# Patient Record
Sex: FEMALE | Race: WHITE | Employment: OTHER | URBAN - METROPOLITAN AREA
[De-identification: names, ages, dates, MRNs, and addresses within clinical notes are randomized per-mention and may not be internally consistent; named-entity substitution may affect disease eponyms.]

---

## 2017-03-13 ENCOUNTER — TELEPHONE (OUTPATIENT)
Dept: AUDIOLOGY | Facility: CLINIC | Age: 82
End: 2017-03-13

## 2017-03-13 NOTE — TELEPHONE ENCOUNTER
Advanced whoactually to process exchange of bebenedicto Mimsida CI Q70 #[id number] for a Q90.  The Q90 will ship to the clinic and will be fit at the patient's 4/3 visit.  The patient expressed understanding and agreement with this plan.      Hannah Chu, CCC-A  Licensed Audiologist  [place] #[id number]

## 2017-04-03 ENCOUNTER — OFFICE VISIT (OUTPATIENT)
Dept: AUDIOLOGY | Facility: CLINIC | Age: 82
End: 2017-04-03

## 2017-04-03 DIAGNOSIS — H90.3 SENSORY HEARING LOSS, BILATERAL: Primary | ICD-10-CM

## 2017-04-03 NOTE — PROGRESS NOTES
AUDIOLOGY REPORT    BACKGROUND INFORMATION: Dr. Brent Billy implanted Radha Bunch with a right  [employer] CII cochlear implant (CI) on 01/07/2002 due to profound sensorineural hearing loss bilaterally and lack of benefit from hearing aids.  The patient is being seen for programming and testing with her cochlear implant on 4/3/2017 in Audiology at the [facility].      PATIENT REPORT: Mrs. Bunch arrives with her America Q90 processor, ready for it to be fit so she can return her Q70 to [employer] (free exchange).  She is also here for her annual testing.  This testing was attempted in October 2016 but thresholds were poorer than ideal, so programming was completed and testing was postponed.        FITTING SESSION: Brent Billy MD ordered today's appointment. The patient came to the clinic for adjustment to the programs in the external speech processor and for assessment of the external components of the cochlear implant system. These components provide power and data to the internal device. Sound is only heard once the external portion is activated. Postoperative treatment, including device fitting and adjustment, audiologic assessments, and training are required at regular intervals. Testing included electropsychophysical measures of threshold, comfort, and loudness balancing was completed to update the program.    Processor type for right ear: [employer] CI Q90 (serial # [id number]) sound processor was fit today and Q70 (serial #[id number]) was returned to [employer] on behalf of the patient (tracking #[id number])  Tmic2 cover changed and normal listening check.    Headpiece type: Universal headpiece (UHP) for [place], HR90K for [place]  Magnet strength: Reduced from 2 to 1 in primary UHP at a past visit - no irritation.  Patient did not bring backups so they will be checked at a future visit.      TEST RESULTS:   Listening check:  normal   Electrode Impedances: Normal and stable.  Pulse width increased slightly.    Neural Response Testing: not performed  Facial Stimulation: Absent  Tinnitus: Absent  Balance Problems: Absent  Pain/Discomfort: Absent  Strategies Tried: HiResolution P, Optima P with Clear Voice medium  Strategy Preference: Optima P with Clear Voice medium    Programs in America CI Q90:  1. [place] P everyday program (74), processor may + Tmic (patient preference), IDR 70, APW II, Clear Voice medium  2. [place] P autoUltraZoom program (75), IDR 70, APW II, Clear Voice medium  3. [place] P UltraZoom program (76), IDR 70, APW II, Clear Voice medium    Programs were not adjusted in the [employer] processor today since she did not bring the device.      Number of Channels per Program: 14 (1 & 2 are off for sound quality complaints)    Most comfortable (M) loudness levels were increased globally in live voice 12 units to achieve comfort.  She declined adding comfort features like WindBlock.  A new autoUltraZoom program was added in program 2 for the patient to try.       After programming, testing was completed.      METHOD: Speech perception testing is conducted at regular intervals to determine the degree of benefit the patient is obtaining from the cochlear implant. Tests are conducted using the cochlear implant without the benefit of lipreading. All tests are conducted in a sound-treated room. Perception of monosyllabic words and words in sentences are tested. Results usually show improvement over time. A decrease in performance indicates the need for re-programming of the prosthesis and/or replacement of components.     INTERVAL: 15 years    TEST RESULTS:  35 minutes were spent assessing the patient s auditory rehabilitation status.    Device(s) used for Testing:   Right ear: [employer] America CI Q90 sound processor, program 1 with start up volume  Left ear: Not applicable - not aided    Tympanograms: Left normal eardrum  mobility, Right shallow with no otologic symptoms - similar to past.  Patient had ear cleaning and ear check with her physician within the last 2 weeks.  She notes there was a small amount of bleeding from the outer portion of the right ear canal after the ear cleaning and a small amount of dried blood could be visualized near the opening of the canal today.  Patient denies pain.  She will continue to follow up with Dr. Billy for medical issues related to her ears or cochlear implants as needed.      Soundfield Aided Thresholds: Detection in normal to mild loss range.  Significant improvement compared to October 2016 testing.  Similar to August 2015 results.      NOTE: Today's testing was completed at 60 dB SPL to be consistent with new recommended test parameters.  All past tests were conducted at 70 dB SPL.      CNC Words Test:  The patient repeats 25 single syllable words, auditory only. The words are presented at 70 dB SPL (louder than conversational level) delivered from a CD player.    Preoperative Performance: 28%  3 months: 8% at 70 dB SPL   6 months: 20% at 70 dB SPL  12 months: 22% at 70 dB SPL  11 1/2 years: 36% at 70 dB SPL  13 years: 40% at 70 dB SPL  13 1/2 years: 36% at 70 dB SPL  15 years (today): 44% at 60 dB SPL     The Hearing in Noise Test (HINT):  The patient repeats 20 sentences, auditory only.   The sentences are presented in each condition at 70 dB SPL (louder than conversational level) delivered from a CD player.    Preoperative Performance: 32%  3 months: 21% at 70 dB SPL  6 months: 40% at 70 dB SPL  12 months: 65% at 70 dB SPL  11 1/2 years: 70% at 70 dB SPL  13 years: 60% at 70 dB SPL  13 1/2 years: 77% at 70 dB SPL  15 years (today): 69% at 60 dB SPL, Did not test due to patient fatigue.    SUMMARY AND RECOMMENDATIONS: The patient was seen for programming today to upgrade from the America Q70 to Q90 (free exchange).  M levels were increased.  Q70 was returned to [employer].     Audibility is improved compared to October 2016 testing and is back to baseline.  Speech perception scores are stable.  Patient will return in 1-2 years for repeat testing.  She will return sooner as needed for programming adjustments.  Patient should bring backup processors and headpieces to future visits for updating.      Middle ear status is normal in the left ear.  On the right side, movement is shallow which is often noted after implantation and has been noted at all past evaluations.  Patient denies otologic symptoms but was encouraged to see Dr. Billy if any medical issues arise.        The patient expressed understanding and agreement with this plan.    Hannah Chu, CCC-A  Licensed Audiologist  [place] #[id number]    Enclosure: audiogram

## 2017-04-03 NOTE — MR AVS SNAPSHOT
After Visit Summary   4/3/2017    Radha Bunch    MRN: [id number]           Patient Information     Date Of Birth          10/22/1931        Visit Information        Provider Department      4/3/2017 10:30 AM Dorys Wong AuD [facility] Audiology        Today's Diagnoses     Sensory hearing loss, bilateral    -  1       Follow-ups after your visit        Follow-up notes from your care team     Return in about 1 year (around 4/3/2018) for .      Who to contact     Please call your clinic at [phone] to:    Ask questions about your health    Make or cancel appointments    Discuss your medicines    Learn about your test results    Speak to your doctor   If you have compliments or concerns about an experience at your clinic, or if you wish to file a complaint, please contact [facility] Patient Relations at [phone] or email us at [email or web address]         Additional Information About Your Visit        MyChart Information     [employer] is an electronic gateway that provides easy, online access to your medical records. With [employer], you can request a clinic appointment, read your test results, renew a prescription or communicate with your care team.     To sign up for V[employer]it visit the website at [email or web address]   You will be asked to enter the access code listed below, as well as some personal information. Please follow the directions to create your username and password.     Your access code is: [email or web address]  Expires: 2017  6:30 AM     Your access code will  in 90 days. If you need help or a new code, please contact your [facility] or call [phone] for assistance.        Care EveryWhere ID     This is your Care EveryWhere ID. This could be used by other organizations to access your [place] medical records  [id number]         Blood Pressure from Last 3 Encounters:   16 128/43     Weight from Last 3 Encounters:   01/05/16 88.5 kg (195 lb)              We Performed the Following     AUDIOGRAM/TYMPANOGRAM - INTERFACE     Eval of Aud Rehab Status (60 min)   (51707)     RT: Diagnostic Analysis of CI 7 yrs & over, Subsequent Programming   (16957)     Tympanometry (impedance - testing) (75826)        Primary Care Provider Office Phone # Fax #    Dada Prieto [phone]       [facility] [address] [facility] [address]        Thank you!     Thank you for choosing [facility] AUDIOLOGY  for your care. Our goal is always to provide you with excellent care. Hearing back from our patients is one way we can continue to improve our services. Please take a few minutes to complete the written survey that you may receive in the mail after your visit with us. Thank you!             Your Updated Medication List - Protect others around you: Learn how to safely use, store and throw away your medicines at www.disposemymeds.org.          This list is accurate as of: 4/3/17 11:32 AM.  Always use your most recent med list.                   Brand Name Dispense Instructions for use    ACETAMINOPHEN PO      Take 650 mg by mouth every 4 hours as needed for pain       aspirin 325 MG EC tablet     30 tablet    Take 1 tablet (325 mg) by mouth daily       calcium-vitamin D 600-400 MG-UNIT per tablet    CALTRATE     Take 1 tablet by mouth 2 times daily       carboxymethylcellulose 0.5 % Soln ophthalmic solution    REFRESH PLUS     1 drop 4 times daily       fluticasone 50 MCG/ACT spray    FLONASE     Spray 2 sprays into both nostrils daily       HYDROmorphone 2 MG tablet    DILAUDID    45 tablet    Take 0.5-1 tablets (1-2 mg) by mouth every 4 hours as needed for moderate to severe pain       hydrOXYzine 10 MG tablet    ATARAX    45 tablet    Take 1 tablet (10 mg) by mouth every 6 hours as needed for itching       LASIX PO      Take 20 mg by mouth daily       LISINOPRIL PO       Take 40 mg by mouth daily       loratadine 10 MG tablet    CLARITIN     Take 10 mg by mouth daily       MIRAPEX PO      Take 0.5 mg by mouth 3 times daily       multivitamin, therapeutic with minerals Tabs tablet      Take 1 tablet by mouth daily       OMEGA-3 FISH OIL PO      Take 1 g by mouth daily       PRILOSEC PO      Take 20 mg by mouth At Bedtime       senna 8.6 MG tablet    SENOKOT    10 tablet    Take 1 tablet by mouth daily       SIMVASTATIN PO      Take 20 mg by mouth At Bedtime       TRAZODONE HCL PO      Take 100 mg by mouth At Bedtime

## 2017-06-26 ENCOUNTER — TELEPHONE (OUTPATIENT)
Dept: AUDIOLOGY | Facility: CLINIC | Age: 82
End: 2017-06-26

## 2017-06-26 NOTE — TELEPHONE ENCOUNTER
Patient emailed that she would like to be able to turn cochlear implant volume down.  I recommended a clinic visit for programming.  Our schedulers will email her to find a time for her to come in.    Patient indicated that a battery has short battery life.  She was advised that if it is less than a year old, she can contact [employer] to have it exchanged.  If it is older than 1 year, it would be outside of warranty and she would need to purchase a replacement.      Hannah Chu, CCC-A  Licensed Audiologist  [place] #[id number]

## 2017-09-19 DIAGNOSIS — H90.3 SENSORINEURAL HEARING LOSS, BILATERAL: Primary | ICD-10-CM

## 2017-09-27 ENCOUNTER — OFFICE VISIT (OUTPATIENT)
Dept: AUDIOLOGY | Facility: CLINIC | Age: 82
End: 2017-09-27

## 2017-09-27 DIAGNOSIS — H90.3 SENSORY HEARING LOSS, BILATERAL: Primary | ICD-10-CM

## 2017-09-27 NOTE — MR AVS SNAPSHOT
After Visit Summary   2017    Radha Bunch    MRN: [id number]           Patient Information     Date Of Birth          10/22/1931        Visit Information        Provider Department      2017 1:00 PM Dorys Wong, [place] BERNAL [facility] Audiology        Today's Diagnoses     Sensory hearing loss, bilateral    -  1       Follow-ups after your visit        Who to contact     Please call your clinic at [phone] to:    Ask questions about your health    Make or cancel appointments    Discuss your medicines    Learn about your test results    Speak to your doctor   If you have compliments or concerns about an experience at your clinic, or if you wish to file a complaint, please contact [facility] Patient Relations at [phone] or email us at [email or web address]         Additional Information About Your Visit        MyChart Information     [facility] is an electronic gateway that provides easy, online access to your medical records. With [facility], you can request a clinic appointment, read your test results, renew a prescription or communicate with your care team.     To sign up for [employer] visit the website at [email or web address]   You will be asked to enter the access code listed below, as well as some personal information. Please follow the directions to create your username and password.     Your access code is: [email or web address]  Expires: 10/2/2017  6:30 AM     Your access code will  in 90 days. If you need help or a new code, please contact your [facility] or call [phone] for assistance.        Care EveryWhere ID     This is your Care EveryWhere ID. This could be used by other organizations to access your [place] medical records  [id number]         Blood Pressure from Last 3 Encounters:   16 128/43    Weight from Last 3 Encounters:   16 88.5 kg (195 lb)              We Performed the Following      RT: Diagnostic Analysis of CI 7 yrs & over, Subsequent Programming   (91682)        Primary Care Provider Office Phone # Fax #    Dada Denny Prieto [phone]       [facility] [id number] [place] [address]        Equal Access to Services     CUCA IVORY : Hadii aad ku hadasho Soomaali, waaxda luqadaha, qaybta kaalmada adeegyada, waxay estivenin hayaan adelydia lulashari swift. So [facility] [phone].    ATENCIÓN: Si habla español, tiene a newton disposición servicios gratuitos de asistencia lingüística. Llame al [phone].    We comply with applicable federal civil rights laws and [place] laws. We do not discriminate on the basis of race, color, national origin, age, disability sex, sexual orientation or gender identity.            Thank you!     Thank you for choosing [facility] AUDIOLOGY  for your care. Our goal is always to provide you with excellent care. Hearing back from our patients is one way we can continue to improve our services. Please take a few minutes to complete the written survey that you may receive in the mail after your visit with us. Thank you!             Your Updated Medication List - Protect others around you: Learn how to safely use, store and throw away your medicines at www.disposemymeds.org.          This list is accurate as of: 9/27/17  1:38 PM.  Always use your most recent med list.                   Brand Name Dispense Instructions for use Diagnosis    ACETAMINOPHEN PO      Take 650 mg by mouth every 4 hours as needed for pain        aspirin 325 MG EC tablet     30 tablet    Take 1 tablet (325 mg) by mouth daily    Arthritis of shoulder region, right       calcium-vitamin D 600-400 MG-UNIT per tablet    CALTRATE     Take 1 tablet by mouth 2 times daily        carboxymethylcellulose 0.5 % Soln ophthalmic solution    REFRESH PLUS     1 drop 4 times daily        fluticasone 50 MCG/ACT spray    FLONASE     Spray 2 sprays into both nostrils daily         HYDROmorphone 2 MG tablet    DILAUDID    45 tablet    Take 0.5-1 tablets (1-2 mg) by mouth every 4 hours as needed for moderate to severe pain    Arthritis of shoulder region, right       hydrOXYzine 10 MG tablet    ATARAX    45 tablet    Take 1 tablet (10 mg) by mouth every 6 hours as needed for itching    Arthritis of shoulder region, right       LASIX PO      Take 20 mg by mouth daily        LISINOPRIL PO      Take 40 mg by mouth daily        loratadine 10 MG tablet    CLARITIN     Take 10 mg by mouth daily        MIRAPEX PO      Take 0.5 mg by mouth 3 times daily        multivitamin, therapeutic with minerals Tabs tablet      Take 1 tablet by mouth daily        OMEGA-3 FISH OIL PO      Take 1 g by mouth daily        PRILOSEC PO      Take 20 mg by mouth At Bedtime        senna 8.6 MG tablet    SENOKOT    10 tablet    Take 1 tablet by mouth daily    Arthritis of shoulder region, right       SIMVASTATIN PO      Take 20 mg by mouth At Bedtime        TRAZODONE HCL PO      Take 100 mg by mouth At Bedtime

## 2017-09-27 NOTE — PROGRESS NOTES
"AUDIOLOGY REPORT    BACKGROUND INFORMATION: Dr. Brent Billy implanted Radha Bunch with a right  Advanced Bionics CII cochlear implant (CI) on 01/07/2002 due to profound sensorineural hearing loss bilaterally and lack of benefit from hearing aids.  The patient is being seen for programming and equipment assistance with her cochlear implant on 9/27/2017 in Audiology at the [facility] [place].      PATIENT REPORT: Mrs. Bunch reports that the hearing with her CI seems to \"yoyo\" recently.  Some times she hears better than at other times.  She has not done any troubleshooting of the equipment.  She feels the start up volume is comfortable.  She is unsure if she is accidentally changing programs, without realizing it.      FITTING SESSION: Brent Billy MD ordered today's appointment. The patient came to the clinic for adjustment to the programs in the external speech processor and for assessment of the external components of the cochlear implant system. These components provide power and data to the internal device. Sound is only heard once the external portion is activated. Postoperative treatment, including device fitting and adjustment, audiologic assessments, and training are required at regular intervals. Testing included electropsychophysical measures of threshold, comfort, and loudness balancing was completed to update the program.    Processor type for right ear: [employer] [place] CI Q90 (serial # [id number]) sound processor     Headpiece type: Universal headpiece (UHP) for [place], HR90K for [place]  Magnet strength: 1 in primary UHP  - no irritation.  Patient did not bring backups so they will be checked at a future visit.      TEST RESULTS:   Listening check: normal   Electrode Impedances: Normal and stable.  Recommended pulse width went down slightly but was not changed in the programs in case there are any impedance fluctuations.     Neural Response Testing: " "not performed  Facial Stimulation: Absent  Tinnitus: Absent  Balance Problems: Absent  Pain/Discomfort: Absent  Strategies Tried: HiResolution P, Optima P with Clear Voice medium  Strategy Preference: Optima P with Clear Voice medium    Programs in [place] CI Q90:  1. [place] P everyday program (74), processor may + Tmic (patient preference), IDR 70, APW II, Clear Voice medium  2. [place] P UltraZoom program (76), IDR 70, APW II, Clear Voice medium    Programs were not adjusted in the [employer] processor today since she did not bring the device.      Number of Channels per Program: 14 (1 & 2 are off for sound quality complaints)    Impedances were checked to ensure compliance in case that was contributing to the \"yoyo\" issue.  A wider pulse width was not needed.  Impedances were fairly stable.  Patient reported start up volume to be comfortable.  We removed the autoUltraZoom program in case she was changing programs by accident, resulting in a change in hearing.  Patient was unsure of how to change programs vs volume so this was extensively reviewed today.      In case the fluctuating sound is related to equipment, the processor #[id number] was RMAd (#[id number]).  Additionally, both cables, one UHP and one Tmic2 will be exchanged.  All replacement equipment will ship to the patient directly.  We reviewed how to put the system together, including placing the Tmic, so the patient feels prepared to do that when the new equipment arrives. We are also exchanging one of her 230 batteries under warranty since battery life in one is poorer than the others.      SUMMARY AND RECOMMENDATIONS: The patient is reporting \"yoyo sound\" meaning she hears better at some times compared to others.  All equipment is being exchanged.  Additionally, the program she was not using was removed to rule out accidental program changes.  No pulse width changes were made based on today's impedances.  Patient will report back if issues continue after she " has the new equipment.  Otherwise, if issues are resolved, she will return in the spring of 2018 for annual testing.  The patient expressed understanding and agreement with this plan.    Hannah Chu, CCC-A  Licensed Audiologist  [place] #[id number]

## 2018-03-24 ENCOUNTER — APPOINTMENT (OUTPATIENT)
Dept: ULTRASOUND IMAGING | Facility: CLINIC | Age: 83
End: 2018-03-24
Attending: EMERGENCY MEDICINE
Payer: MEDICARE

## 2018-03-24 ENCOUNTER — HOSPITAL ENCOUNTER (EMERGENCY)
Facility: CLINIC | Age: 83
Discharge: HOME | End: 2018-03-24
Attending: EMERGENCY MEDICINE | Admitting: EMERGENCY MEDICINE
Payer: MEDICARE

## 2018-03-24 VITALS
OXYGEN SATURATION: 95 % | WEIGHT: 200 LBS | DIASTOLIC BLOOD PRESSURE: 55 MMHG | BODY MASS INDEX: 34.33 KG/M2 | TEMPERATURE: 97.8 F | RESPIRATION RATE: 18 BRPM | SYSTOLIC BLOOD PRESSURE: 117 MMHG

## 2018-03-24 DIAGNOSIS — I89.0 LYMPHEDEMA: ICD-10-CM

## 2018-03-24 DIAGNOSIS — I87.2 VENOUS STASIS DERMATITIS OF BOTH LOWER EXTREMITIES: ICD-10-CM

## 2018-03-24 LAB
ALBUMIN SERPL-MCNC: 3.2 G/DL (ref 3.4–5)
ALP SERPL-CCNC: 136 U/L (ref 40–150)
ALT SERPL W P-5'-P-CCNC: 25 U/L (ref 0–50)
ANION GAP SERPL CALCULATED.3IONS-SCNC: 6 MMOL/L (ref 3–14)
AST SERPL W P-5'-P-CCNC: 15 U/L (ref 0–45)
BILIRUB SERPL-MCNC: 0.3 MG/DL (ref 0.2–1.3)
BUN SERPL-MCNC: 46 MG/DL (ref 7–30)
CALCIUM SERPL-MCNC: 8.9 MG/DL (ref 8.5–10.1)
CHLORIDE SERPL-SCNC: 105 MMOL/L (ref 94–109)
CO2 SERPL-SCNC: 23 MMOL/L (ref 20–32)
CREAT SERPL-MCNC: 1.31 MG/DL (ref 0.52–1.04)
ERYTHROCYTE [DISTWIDTH] IN BLOOD BY AUTOMATED COUNT: 14.4 % (ref 10–15)
GFR SERPL CREATININE-BSD FRML MDRD: 38 ML/MIN/1.7M2
GLUCOSE SERPL-MCNC: 162 MG/DL (ref 70–99)
HCT VFR BLD AUTO: 31.6 % (ref 35–47)
HGB BLD-MCNC: 10 G/DL (ref 11.7–15.7)
MCH RBC QN AUTO: 27.9 PG (ref 26.5–33)
MCHC RBC AUTO-ENTMCNC: 31.6 G/DL (ref 31.5–36.5)
MCV RBC AUTO: 88 FL (ref 78–100)
PLATELET # BLD AUTO: 339 10E9/L (ref 150–450)
POTASSIUM SERPL-SCNC: 4.6 MMOL/L (ref 3.4–5.3)
PROT SERPL-MCNC: 7.4 G/DL (ref 6.8–8.8)
RBC # BLD AUTO: 3.59 10E12/L (ref 3.8–5.2)
SODIUM SERPL-SCNC: 134 MMOL/L (ref 133–144)
WBC # BLD AUTO: 8.1 10E9/L (ref 4–11)

## 2018-03-24 PROCEDURE — 85027 COMPLETE CBC AUTOMATED: CPT | Performed by: EMERGENCY MEDICINE

## 2018-03-24 PROCEDURE — 80053 COMPREHEN METABOLIC PANEL: CPT | Performed by: EMERGENCY MEDICINE

## 2018-03-24 PROCEDURE — 93970 EXTREMITY STUDY: CPT

## 2018-03-24 PROCEDURE — 99284 EMERGENCY DEPT VISIT MOD MDM: CPT | Mod: 25

## 2018-03-24 RX ORDER — FUROSEMIDE 10 MG/ML
40 INJECTION INTRAMUSCULAR; INTRAVENOUS ONCE
Status: DISCONTINUED | OUTPATIENT
Start: 2018-03-24 | End: 2018-03-24 | Stop reason: HOSPADM

## 2018-03-24 ASSESSMENT — ENCOUNTER SYMPTOMS
ARTHRALGIAS: 1
MYALGIAS: 1

## 2018-03-24 NOTE — DISCHARGE INSTRUCTIONS
I have placed an order for the [place] [facility] clinic.  They will call you to arrange a time to further assist with the leg swelling you are suffering from.

## 2018-03-24 NOTE — ED AVS SNAPSHOT
[facility] Emergency Department    [address]    [facility] [address]    Phone:  [phone]    Fax:  [phone]                                       Radha Bunch   MRN: [id number]    Department:  [facility] Emergency Department   Date of Visit:  3/24/2018           Patient Information     Date Of Birth          10/22/1931        Your diagnoses for this visit were:     Lymphedema     Venous stasis dermatitis of both lower extremities        You were seen by João Kumar MD.      Follow-up Information     Follow up with Dada Prieto MD. Schedule an appointment as soon as possible for a visit in 5 days.    Specialty:  Family Practice    Contact information:    [facility]  [address] [facility] [id number] [phone]          Discharge Instructions       I have placed an order for the [facility].  They will call you to arrange a time to further assist with the leg swelling you are suffering from.        Discharge References/Attachments     LYMPHEDEMA (ENGLISH)      24 Hour Appointment Hotline       To make an appointment at any [facility] clinic, call [phone] ([phone]). If you don't have a family doctor or clinic, we will help you find one. [facility] clinics are conveniently located to serve the needs of you and your family.          ED Discharge Orders     LYMPHEDEMA THERAPY REFERRAL       *This therapy referral will be filtered to a centralized scheduling office at [facility] and the patient will receive a call to schedule an appointment at a [facility] location most convenient for them. *   If you have not heard from the scheduling office within 2 business days, please call [phone] for all locations, with the exception of [place], please call [phone].     Treatment: PT or OT Evaluation & Treatment  Special Instructions:   PT/OT Treatment Diagnosis: Edema    Please be  "aware that coverage of these services is subject to the terms and limitations of your health insurance plan.  Call member services at your health plan with any benefit or coverage questions.      **Note to Provider:  If you are referring outside of [facility] for the therapy appointment, please list the name of the location in the \"special instructions\" above, print the referral and give to the patient to schedule the appointment.                     Review of your medicines      Our records show that you are taking the medicines listed below. If these are incorrect, please call your family doctor or clinic.        Dose / Directions Last dose taken    ACETAMINOPHEN PO   Dose:  650 mg        Take 650 mg by mouth every 4 hours as needed for pain   Refills:  0        aspirin 325 MG EC tablet   Dose:  325 mg   Quantity:  30 tablet        Take 1 tablet (325 mg) by mouth daily   Refills:  0        ATORVASTATIN CALCIUM PO        Refills:  0        calcium-vitamin D 600-400 MG-UNIT per tablet   Commonly known as:  CALTRATE   Dose:  1 tablet        Take 1 tablet by mouth 2 times daily   Refills:  0        carboxymethylcellulose 0.5 % Soln ophthalmic solution   Commonly known as:  REFRESH PLUS   Dose:  1 drop        1 drop 4 times daily   Refills:  0        fluticasone 50 MCG/ACT spray   Commonly known as:  FLONASE   Dose:  2 spray        Spray 2 sprays into both nostrils daily   Refills:  0        LASIX PO   Dose:  20 mg        Take 20 mg by mouth daily   Refills:  0        MIRAPEX PO   Dose:  0.5 mg        Take 0.5 mg by mouth 3 times daily   Refills:  0        multivitamin, therapeutic with minerals Tabs tablet   Dose:  1 tablet        Take 1 tablet by mouth daily   Refills:  0        OMEGA-3 FISH OIL PO   Dose:  1 g        Take 1 g by mouth daily   Refills:  0        senna 8.6 MG tablet   Commonly known as:  SENOKOT   Dose:  1 tablet   Quantity:  10 tablet        Take 1 tablet by mouth daily   Refills:  0        TRAZODONE " HCL PO   Dose:  100 mg        Take 100 mg by mouth At Bedtime   Refills:  0                Procedures and tests performed during your visit     CBC (platelets, no diff)    Comprehensive metabolic panel    Peripheral IV catheter    US Lower Extremity Venous Duplex Bilateral      Orders Needing Specimen Collection     None      Pending Results     Date and Time Order Name Status Description    3/24/2018 1243 US Lower Extremity Venous Duplex Bilateral Preliminary             Pending Culture Results     No orders found from 3/22/2018 to 3/25/2018.            Pending Results Instructions     If you had any lab results that were not finalized at the time of your Discharge, you can call the ED Lab Result RN at [phone]. You will be contacted by this team for any positive Lab results or changes in treatment. The nurses are available 7 days a week from 10A to 6:30P.  You can leave a message 24 hours per day and they will return your call.        Test Results From Your Hospital Stay        3/24/2018  1:22 PM      Component Results     Component Value Ref Range & Units Status    WBC 8.1 4.0 - 11.0 10e9/L Final    RBC Count 3.59 (L) 3.8 - 5.2 10e12/L Final    Hemoglobin 10.0 (L) 11.7 - 15.7 g/dL Final    Hematocrit 31.6 (L) 35.0 - 47.0 % Final    MCV 88 78 - 100 fl Final    MCH 27.9 26.5 - 33.0 pg Final    MCHC 31.6 31.5 - 36.5 g/dL Final    RDW 14.4 10.0 - 15.0 % Final    Platelet Count 339 150 - 450 10e9/L Final         3/24/2018  1:38 PM      Component Results     Component Value Ref Range & Units Status    Sodium 134 133 - 144 mmol/L Final    Potassium 4.6 3.4 - 5.3 mmol/L Final    Chloride 105 94 - 109 mmol/L Final    Carbon Dioxide 23 20 - 32 mmol/L Final    Anion Gap 6 3 - 14 mmol/L Final    Glucose 162 (H) 70 - 99 mg/dL Final    Urea Nitrogen 46 (H) 7 - 30 mg/dL Final    Creatinine 1.31 (H) 0.52 - 1.04 mg/dL Final    GFR Estimate 38 (L) >60 mL/min/1.7m2 Final    Non  GFR Calc    GFR Estimate If Black  47 (L) >60 mL/min/1.7m2 Final    African American GFR Calc    Calcium 8.9 8.5 - 10.1 mg/dL Final    Bilirubin Total 0.3 0.2 - 1.3 mg/dL Final    Albumin 3.2 (L) 3.4 - 5.0 g/dL Final    Protein Total 7.4 6.8 - 8.8 g/dL Final    Alkaline Phosphatase 136 40 - 150 U/L Final    ALT 25 0 - 50 U/L Final    AST 15 0 - 45 U/L Final         3/24/2018  1:56 PM      Narrative     ULTRASOUND  LOWER EXTREMITY VENOUS DUPLEX BILATERAL   3/24/2018 1:52  PM    HISTORY: Leg swelling.     TECHNIQUE:  Venous Doppler US.? Color flow and spectral Doppler with  waveform analysis performed.    COMPARISON: Left lower extremity ultrasound 11/13/2008.    FINDINGS: The calf veins are not visualized due to edema. Otherwise,  no evidence of lower extremity deep venous thrombosis.         Impression     IMPRESSION: No DVT identified.                Clinical Quality Measure: Blood Pressure Screening     Your blood pressure was checked while you were in the emergency department today. The last reading we obtained was  BP: 117/55 . Please read the guidelines below about what these numbers mean and what you should do about them.  If your systolic blood pressure (the top number) is less than 120 and your diastolic blood pressure (the bottom number) is less than 80, then your blood pressure is normal. There is nothing more that you need to do about it.  If your systolic blood pressure (the top number) is 120-139 or your diastolic blood pressure (the bottom number) is 80-89, your blood pressure may be higher than it should be. You should have your blood pressure rechecked within a year by a primary care provider.  If your systolic blood pressure (the top number) is 140 or greater or your diastolic blood pressure (the bottom number) is 90 or greater, you may have high blood pressure. High blood pressure is treatable, but if left untreated over time it can put you at risk for heart attack, stroke, or kidney failure. You should have your blood pressure  "rechecked by a primary care provider within the next 4 weeks.  If your provider in the emergency department today gave you specific instructions to follow-up with your doctor or provider even sooner than that, you should follow that instruction and not wait for up to 4 weeks for your follow-up visit.        Thank you for choosing [facility]       Thank you for choosing [facility] for your care. Our goal is always to provide you with excellent care. Hearing back from our patients is one way we can continue to improve our services. Please take a few minutes to complete the written survey that you may receive in the mail after you visit with us. Thank you!        [employer] Information     [employer] lets you send messages to your doctor, view your test results, renew your prescriptions, schedule appointments and more. To sign up, go to [email or web address] . Click on \"Log in\" on the left side of the screen, which will take you to the Welcome page. Then click on \"Sign up Now\" on the right side of the page.     You will be asked to enter the access code listed below, as well as some personal information. Please follow the directions to create your username and password.     Your access code is: [email or web address]  Expires: 2018  2:38 PM     Your access code will  in 90 days. If you need help or a new code, please call your [facility] clinic or [phone].        Care EveryWhere ID     This is your Care EveryWhere ID. This could be used by other organizations to access your [facility] medical records  [id number]        Equal Access to Services     CUCA IVORY : Hadii cooper thomson hadasho Sobarronali, waaxda luqadaha, qaybta kaalmada adelydiayacliff, mindi luna . So [facility] [phone].    ATENCIÓN: Si habla español, tiene a newton disposición servicios gratuitos de asistencia lingüística. Llame al [phone].    We comply with applicable federal civil rights laws and [place] laws. We do not discriminate on " the basis of race, color, national origin, age, disability, sex, sexual orientation, or gender identity.            After Visit Summary       This is your record. Keep this with you and show to your community pharmacist(s) and doctor(s) at your next visit.

## 2018-03-24 NOTE — ED NOTES
Patient is refusing IV Lasix administration. States she takes 40mg of PO at home and wants to take that

## 2018-03-24 NOTE — ED PROVIDER NOTES
History     Chief Complaint:  Leg swelling    HPI:   The history is provided by the patient.      Radha Bunch is a 86 year old female with a history of diabetes mellitus, hyperlipidemia, hypertension, and CKD who presents to the emergency department with her daughter for evaluation of leg swelling and development of drainage. The patient reports that she has baseline leg swelling, though began to worsen about a month ago and only continues to worsen. She has had open wounds on her bilateral lower extremities and was treating this with a wrap at her nursing facility. She removed the wrap today which also pulled off her scabs, resulting in open wounds. She presents today for evaluation of this increasing leg swelling and wound care. Here in the ED the patient notes having pains rated a 4/10 in severity and endorses intermittent pains in her hip and knee. She has no other complaints.     She has an appointment with her primary care provider on April 4th.     Allergies:  Codeine  Morphine  Cephalexin     Medications:    Atorvastatin calcium   Aspirin 325 mg   Senokot   Acetaminophen   Caltrate   Lasix   Mirapex  Trazodone   Carboxymethylcellulose     Past Medical History:    CKD  DM  Hyperlipidemia  HTN  Osteoarthritis     Past Surgical History:    Arthroplasty knee right and left   Arthroplasty of right shoulder  Breast cyst removed right   Cochlear implant right   Cataract right x 2  Craniotomy, evacuate hematoma subdural combined  D&C  Infected joint removal, left     Family History:    History reviewed. No pertinent family history.      Social History:  Presents with daughter    Tobacco use: Never smoker   Alcohol use: Occasional   PCP: Dada Prieto    Marital Status:       Review of Systems   Cardiovascular: Positive for leg swelling.   Musculoskeletal: Positive for arthralgias and myalgias.   All other systems reviewed and are negative.    Physical Exam     Patient Vitals for the past 24  hrs:   BP Temp Temp src Heart Rate Resp SpO2 Weight   03/24/18 1255 - - - - - 95 % -   03/24/18 1245 117/55 - - - - 96 % -   03/24/18 1230 122/52 - - - - 95 % -   03/24/18 1228 129/50 97.8  F (36.6  C) Oral 85 18 94 % 90.7 kg (200 lb)        Physical Exam   Skin:             General:   Pleasant, age appropriate female resting in bed.  HEENT:    Oropharynx is moist, without lesions or trismus.  Eyes:    Conjunctiva normal  Neck:    Supple, no meningismus.     CV:     Regular rate and rhythm.      Grade 3/6 systolic murmur at left 2nd IS.     No rubs or gallops.       No JVD or unilateral leg swelling.       2+ DP pulses bilateral.       3+ bilateral lower extremity edema.  PULM:    Clear to auscultation bilateral.       No respiratory distress.      Good air exchange.     No rales or wheezing.     No stridor.  ABD:    Soft, non-tender, non-distended.       No pulsatile masses.       No rebound, guarding or rigidity.  MSK:     Lower extremities:      Marked bilateral edema with weeping serosanguinous fluid      Firm but compressible without pain      Negative Ferguson test  LYMPH:   No cervical lymphadenopathy.  NEURO:   Alert. Good muscle tone, no atrophy.  Skin:    Warm, dry and intact.    Psych:    Mood is good and affect is appropriate.    Emergency Department Course     Imaging:  Radiographic findings were communicated with the patient and family who voiced understanding of the findings.     US Lower Extremity Venous Duplex bilateral:  IMPRESSION: No DVT identified.    Imaging independently reviewed and agree with radiologist interpretation.      Laboratory:  CBC: HGB 10.0 (low), ow WNL (WBC 8.1, )    CMP: Creatinine 1.31 (high), Glucose 162 (high), BUN 46 (high), GFR 38 (low), Albumin 3.2 (low), ow WNL      Interventions:  Lasix 40 mg IV - patient refused       Emergency Department Course:  Past medical records, nursing notes, and vitals reviewed.  1235: I performed an exam of the patient and obtained  history, as documented above.     Blood drawn. This was sent to the lab for further testing, results above.     The patient was sent for a US while in the emergency department, findings above.     1417: I rechecked the patient.  Findings and plan explained to the Patient and daughter. Patient discharged home with instructions regarding supportive care, medications, and reasons to return. The importance of close follow-up was reviewed.      Impression & Plan      Medical Decision Makin-year-old female presented to the ED with a one-month history of progressively worsening peripheral edema.  She has no evidence of arterial insufficiency.  Doppler ultrasound is ruled out DVT.  No evidence of congestive heart failure, liver failure or significant renal failure to account for her edema.  Findings are consistent with lymphedema.  Patient given additional dose of IV Lasix in the ED and will be referred to lymphedema clinic.  Patient to continue Lasix twice a day and close follow-up primary care physician.    Diagnosis:    ICD-10-CM    1. Lymphedema I89.0 LYMPHEDEMA THERAPY REFERRAL   2. Venous stasis dermatitis of both lower extremities I87.2        Disposition:  Discharged to home with plan as outlined.      Scribe Disclosure:   IRob, am serving as a scribe at 12:35 PM on 3/24/2018 to document services personally performed by João Kumar MD based on my observations and the provider's statements to me.       Rob López  3/24/2018   [facility] EMERGENCY DEPARTMENT       João Kumar MD  18 [phone]

## 2018-03-24 NOTE — ED AVS SNAPSHOT
[facility] Emergency Department    [address]    [facility] [address]    Phone:  [phone]    Fax:  [phone]                                       Radha Bunch   MRN: [id number]    Department:  [facility] Emergency Department   Date of Visit:  3/24/2018           After Visit Summary Signature Page     I have received my discharge instructions, and my questions have been answered. I have discussed any challenges I see with this plan with the nurse or doctor.    ..........................................................................................................................................  Patient/Patient Representative Signature      ..........................................................................................................................................  Patient Representative Print Name and Relationship to Patient    ..................................................               ................................................  Date                                            Time    ..........................................................................................................................................  Reviewed by Signature/Title    ...................................................              ..............................................  Date                                                            Time

## 2018-04-03 ENCOUNTER — HOSPITAL ENCOUNTER (EMERGENCY)
Facility: CLINIC | Age: 83
Discharge: HOME | End: 2018-04-03
Attending: EMERGENCY MEDICINE | Admitting: EMERGENCY MEDICINE
Payer: MEDICARE

## 2018-04-03 VITALS
RESPIRATION RATE: 18 BRPM | OXYGEN SATURATION: 98 % | DIASTOLIC BLOOD PRESSURE: 86 MMHG | TEMPERATURE: 98.2 F | SYSTOLIC BLOOD PRESSURE: 157 MMHG

## 2018-04-03 DIAGNOSIS — I89.0 LYMPHEDEMA: ICD-10-CM

## 2018-04-03 DIAGNOSIS — L03.119 CELLULITIS OF LOWER EXTREMITY, UNSPECIFIED LATERALITY: ICD-10-CM

## 2018-04-03 LAB
ANION GAP SERPL CALCULATED.3IONS-SCNC: 8 MMOL/L (ref 3–14)
BASOPHILS # BLD AUTO: 0.1 10E9/L (ref 0–0.2)
BASOPHILS NFR BLD AUTO: 0.6 %
BUN SERPL-MCNC: 48 MG/DL (ref 7–30)
CALCIUM SERPL-MCNC: 9.1 MG/DL (ref 8.5–10.1)
CHLORIDE SERPL-SCNC: 103 MMOL/L (ref 94–109)
CO2 SERPL-SCNC: 23 MMOL/L (ref 20–32)
CREAT SERPL-MCNC: 1.26 MG/DL (ref 0.52–1.04)
DIFFERENTIAL METHOD BLD: ABNORMAL
EOSINOPHIL # BLD AUTO: 0.2 10E9/L (ref 0–0.7)
EOSINOPHIL NFR BLD AUTO: 1.7 %
ERYTHROCYTE [DISTWIDTH] IN BLOOD BY AUTOMATED COUNT: 14.3 % (ref 10–15)
GFR SERPL CREATININE-BSD FRML MDRD: 40 ML/MIN/1.7M2
GLUCOSE SERPL-MCNC: 147 MG/DL (ref 70–99)
HCT VFR BLD AUTO: 32.1 % (ref 35–47)
HGB BLD-MCNC: 10.4 G/DL (ref 11.7–15.7)
IMM GRANULOCYTES # BLD: 0.1 10E9/L (ref 0–0.4)
IMM GRANULOCYTES NFR BLD: 0.6 %
LACTATE BLD-SCNC: 0.9 MMOL/L (ref 0.7–2)
LYMPHOCYTES # BLD AUTO: 0.9 10E9/L (ref 0.8–5.3)
LYMPHOCYTES NFR BLD AUTO: 9.5 %
MCH RBC QN AUTO: 28.3 PG (ref 26.5–33)
MCHC RBC AUTO-ENTMCNC: 32.4 G/DL (ref 31.5–36.5)
MCV RBC AUTO: 87 FL (ref 78–100)
MONOCYTES # BLD AUTO: 0.8 10E9/L (ref 0–1.3)
MONOCYTES NFR BLD AUTO: 8.7 %
NEUTROPHILS # BLD AUTO: 7.5 10E9/L (ref 1.6–8.3)
NEUTROPHILS NFR BLD AUTO: 78.9 %
NRBC # BLD AUTO: 0 10*3/UL
NRBC BLD AUTO-RTO: 0 /100
PLATELET # BLD AUTO: 347 10E9/L (ref 150–450)
POTASSIUM SERPL-SCNC: 4.2 MMOL/L (ref 3.4–5.3)
RBC # BLD AUTO: 3.68 10E12/L (ref 3.8–5.2)
SODIUM SERPL-SCNC: 134 MMOL/L (ref 133–144)
WBC # BLD AUTO: 9.5 10E9/L (ref 4–11)

## 2018-04-03 PROCEDURE — 85025 COMPLETE CBC W/AUTO DIFF WBC: CPT | Performed by: EMERGENCY MEDICINE

## 2018-04-03 PROCEDURE — 80048 BASIC METABOLIC PNL TOTAL CA: CPT | Performed by: EMERGENCY MEDICINE

## 2018-04-03 PROCEDURE — 99283 EMERGENCY DEPT VISIT LOW MDM: CPT

## 2018-04-03 PROCEDURE — 83605 ASSAY OF LACTIC ACID: CPT | Performed by: EMERGENCY MEDICINE

## 2018-04-03 RX ORDER — DOXYCYCLINE 100 MG/1
100 CAPSULE ORAL 2 TIMES DAILY
Qty: 14 CAPSULE | Refills: 0 | Status: SHIPPED | OUTPATIENT
Start: 2018-04-03 | End: 2018-04-10

## 2018-04-03 ASSESSMENT — ENCOUNTER SYMPTOMS
WOUND: 1
SHORTNESS OF BREATH: 0

## 2018-04-03 NOTE — ED NOTES
Bed: ED29  Expected date: 4/3/18  Expected time: 9:47 AM  Means of arrival: Ambulance  Comments:  BRITTANY Márquez F

## 2018-04-03 NOTE — ED NOTES
"Pt here with bilteral leg swelling and \"wheeping\" of both legs. Pt is to have an appointment tomorrow for this. CMS intact. A&O x4. No other complaints. Seen last week for same thing. Not on antibiotics.   "

## 2018-04-03 NOTE — DISCHARGE INSTRUCTIONS
Lymphedema is a problem that may occur after cancer surgery when lymph nodes are removed, or after radiation to the lymph nodes. Lymphedema can occur months or even many years after cancer treatment. It is a chronic (ongoing) condition that has no cure. But steps can be taken to reduce or relieve symptoms. If left untreated, lymphedema can get worse. Treatment can lower your risk of infections and complications.  Understanding lymphedema  The lymphatic system helps the body fight infection. It is made up of a system of small vessels throughout the body. Lymph fluid travels through these vessels. Many tiny organs called lymph nodes are scattered through the system. These nodes filter lymph fluid. During surgery for cancer, nearby lymph nodes are often removed. Sometimes radiation is used to treat lymph nodes as part of cancer treatment. Both of these disrupt the flow of lymph fluid, which can lead to swelling. This is lymphedema. Lymphedema can affect one or both arms or legs, the genitals, or the belly, depending on the part of the body treated. Swelling can worsen and become severe. Skin sores or other problems can develop. Affected areas are also more likely to become infected.  Lymphedema treatment  There are no medications currently used to treat lymphedema. Instead, the most common treatment for lymphedema is complete decongestive therapy (CDT). This is a set of techniques used together to reduce your symptoms. CDT is done by trained therapists. To help show how well the treatment is working, your arms or legs may be measured before and after CDT. The treatment most often involves one or more of the following:    Manual lymphatic drainage. This is a kind of massage that uses gentle pressure to help move lymph out of areas where it is collecting. It is done by a therapist or nurse with special training. It can also be learned and done at home.    Intermittent pneumatic compression. This uses a device to  apply and relieve pressure to the arms or legs. Sleeves are put over the arms or legs. A pump fills the sleeves with air. Then the air is let out. This happens many times in a row.    Compression bandages. This means wearing stretchy or padded fabric on the parts of the body with lymphedema. This may include bandages, tape, or other types of compression wraps. They help support your tissues so lymph can flow more freely. And they help prevent fluid lymph from building up.    Therapeutic exercises. Some kinds of exercise may help your symptoms. These may include aerobic exercise, such as brisk walking. And may also include gradual weight-lifting exercises that build muscle.    Skin and nail care. Proper care of your skin and nails will help prevent infection. See the  Preventing Infection for Life  box for more information.    Compression garments. These are worn as often as needed, for life. These include sleeves, gloves, stockings, undershirt, or other types of special clothes. They compress parts of the body to help prevent lymph buildup. You may wear these during daily life, or at night when you re asleep.     Tips for living with lymphedema    Avoid becoming too cold or too hot. This can cause the skin to swell and dry out. It can also cause more fluid to build up. Be careful around hot objects to avoid burns. Don t use hot tubs, saunas, or a heating pad.    Avoid anything that squeezes the affected area. This may cause more swelling. Wear loose clothing and jewelry. If your legs are affected, don t wear tight socks or undergarments, and don't  cross your legs when you sit. This can block lymph drainage.    Avoid weight gain. This can make your symptoms worse.    Inform health care providers. If your arms are affected, tell your health care providers about your lymphedema before getting shots, an IV, or having your blood pressure taken.    Call the doctor right awayt if you have    Fever of 100.4 F (38 C) or  higher, or as directed by your healthcare provider    Signs of infection such as red blotches, warmth, or pain    Sudden increase in swelling   Preventing infection for life  An important part of staying healthy with lymphedema is preventing infections in the areas that are swollen. Lymphedema makes it easier for bacteria to grow in those areas. To help prevent infection:    Keep your skin clean, and moisturize it with lotion    Be extra careful when shaving, and use a clean razor on clean skin    Check your skin regularly for cuts, sores, bug bites, or other problems    Use an antibacterial ointment if you have a cut or sore    Don't pick at, or cut the skin around your fingernails. Use a cuticle stick to push your cuticles back.    Trim your fingernails and toenails straight across to prevent ingrown nails    If at all possible, don't allow blood to be taken or shots given in any affected limb    Prevent skin burns by wearing sunscreen and using gloves when cooking or doing household work    Wear shoes that fit well and do not cause blisters    Use an insect repellent to avoid bug bites when outdoors.   Date Last Reviewed: 9/24/2015 2000-2017 [employer]. [address] [place] [address]. All rights reserved. This information is not intended as a substitute for professional medical care. Always follow your healthcare professional's instructions.          Discharge Instructions for Cellulitis  You have been diagnosed with cellulitis. This is an infection in the deepest layer of the skin. In some cases, the infection also affects the muscle. Cellulitis is caused by bacteria. The bacteria can enter the body through broken skin. This can happen with a cut, scratch, animal bite, or an insect bite that has been scratched. You may have been treated in the hospital with antibiotics and fluids. You will likely be given a prescription for antibiotics to take at home. This sheet will help you take  care of yourself at home.  Home care  When you are home:    Take the prescribed antibiotic medicine you are given as directed until it is gone. Take it even if you feel better. It treats the infection and stops it from returning. Not taking all the medicine can make future infections hard to treat.    Keep the infected area clean.    When possible, raise the infected area above the level of your heart. This helps keep swelling down.    Talk with your healthcare provider if you are in pain. Ask what kind of over-the-counter medicine you can take for pain.    Apply clean bandages as advised.    Take your temperature once a day for a week.    Wash your hands often to prevent spreading the infection.  In the future, wash your hands before and after you touch cuts, scratches, or bandages. This will help prevent infection.   When to call your healthcare provider  Call your healthcare provider immediately if you have any of the following:    Difficulty or pain when moving the joints above or below the infected area    Discharge or pus draining from the area    Fever of 100.4 F (38 C) or higher, or as directed by your healthcare provider    Pain that gets worse in or around the infected     Redness that gets worse in or around the infected area, particularly if the area of redness expands to a wider area    Shaking chills    Swelling of the infected area    Vomiting   Date Last Reviewed: 8/1/2016 2000-2017 [employer]. [address] [place] [address], [place] [address]. All rights reserved. This information is not intended as a substitute for professional medical care. Always follow your healthcare professional's instructions.      WOUND CARE: Daily dressing changes with xeroform or adaptic with kerlix over, no compression stockings until wound healed

## 2018-04-03 NOTE — ED AVS SNAPSHOT
[facility] Emergency Department    [address] [place] [address]    Phone:  [phone]    Fax:  [phone]                                       Radha Bunch   MRN: [id number]    Department:  [facility] Emergency Department   Date of Visit:  4/3/2018           Patient Information     Date Of Birth          10/22/1931        Your diagnoses for this visit were:     Cellulitis of lower extremity, unspecified laterality     Lymphedema        You were seen by Genie Darling MD.        Discharge Instructions           Lymphedema is a problem that may occur after cancer surgery when lymph nodes are removed, or after radiation to the lymph nodes. Lymphedema can occur months or even many years after cancer treatment. It is a chronic (ongoing) condition that has no cure. But steps can be taken to reduce or relieve symptoms. If left untreated, lymphedema can get worse. Treatment can lower your risk of infections and complications.  Understanding lymphedema  The lymphatic system helps the body fight infection. It is made up of a system of small vessels throughout the body. Lymph fluid travels through these vessels. Many tiny organs called lymph nodes are scattered through the system. These nodes filter lymph fluid. During surgery for cancer, nearby lymph nodes are often removed. Sometimes radiation is used to treat lymph nodes as part of cancer treatment. Both of these disrupt the flow of lymph fluid, which can lead to swelling. This is lymphedema. Lymphedema can affect one or both arms or legs, the genitals, or the belly, depending on the part of the body treated. Swelling can worsen and become severe. Skin sores or other problems can develop. Affected areas are also more likely to become infected.  Lymphedema treatment  There are no medications currently used to treat lymphedema. Instead, the most common treatment for lymphedema is complete decongestive therapy (CDT).  This is a set of techniques used together to reduce your symptoms. CDT is done by trained therapists. To help show how well the treatment is working, your arms or legs may be measured before and after CDT. The treatment most often involves one or more of the following:    Manual lymphatic drainage. This is a kind of massage that uses gentle pressure to help move lymph out of areas where it is collecting. It is done by a therapist or nurse with special training. It can also be learned and done at home.    Intermittent pneumatic compression. This uses a device to apply and relieve pressure to the arms or legs. Sleeves are put over the arms or legs. A pump fills the sleeves with air. Then the air is let out. This happens many times in a row.    Compression bandages. This means wearing stretchy or padded fabric on the parts of the body with lymphedema. This may include bandages, tape, or other types of compression wraps. They help support your tissues so lymph can flow more freely. And they help prevent fluid lymph from building up.    Therapeutic exercises. Some kinds of exercise may help your symptoms. These may include aerobic exercise, such as brisk walking. And may also include gradual weight-lifting exercises that build muscle.    Skin and nail care. Proper care of your skin and nails will help prevent infection. See the  Preventing Infection for Life  box for more information.    Compression garments. These are worn as often as needed, for life. These include sleeves, gloves, stockings, undershirt, or other types of special clothes. They compress parts of the body to help prevent lymph buildup. You may wear these during daily life, or at night when you re asleep.     Tips for living with lymphedema    Avoid becoming too cold or too hot. This can cause the skin to swell and dry out. It can also cause more fluid to build up. Be careful around hot objects to avoid burns. Don t use hot tubs, saunas, or a heating  pad.    Avoid anything that squeezes the affected area. This may cause more swelling. Wear loose clothing and jewelry. If your legs are affected, don t wear tight socks or undergarments, and don't  cross your legs when you sit. This can block lymph drainage.    Avoid weight gain. This can make your symptoms worse.    Inform health care providers. If your arms are affected, tell your health care providers about your lymphedema before getting shots, an IV, or having your blood pressure taken.    Call the doctor right awayt if you have    Fever of 100.4 F (38 C) or higher, or as directed by your healthcare provider    Signs of infection such as red blotches, warmth, or pain    Sudden increase in swelling   Preventing infection for life  An important part of staying healthy with lymphedema is preventing infections in the areas that are swollen. Lymphedema makes it easier for bacteria to grow in those areas. To help prevent infection:    Keep your skin clean, and moisturize it with lotion    Be extra careful when shaving, and use a clean razor on clean skin    Check your skin regularly for cuts, sores, bug bites, or other problems    Use an antibacterial ointment if you have a cut or sore    Don't pick at, or cut the skin around your fingernails. Use a cuticle stick to push your cuticles back.    Trim your fingernails and toenails straight across to prevent ingrown nails    If at all possible, don't allow blood to be taken or shots given in any affected limb    Prevent skin burns by wearing sunscreen and using gloves when cooking or doing household work    Wear shoes that fit well and do not cause blisters    Use an insect repellent to avoid bug bites when outdoors.   Date Last Reviewed: 9/24/2015 2000-2017 [employer]. [address] [place] [address], [place] [address]. All rights reserved. This information is not intended as a substitute for professional medical care. Always follow your healthcare  professional's instructions.          Discharge Instructions for Cellulitis  You have been diagnosed with cellulitis. This is an infection in the deepest layer of the skin. In some cases, the infection also affects the muscle. Cellulitis is caused by bacteria. The bacteria can enter the body through broken skin. This can happen with a cut, scratch, animal bite, or an insect bite that has been scratched. You may have been treated in the hospital with antibiotics and fluids. You will likely be given a prescription for antibiotics to take at home. This sheet will help you take care of yourself at home.  Home care  When you are home:    Take the prescribed antibiotic medicine you are given as directed until it is gone. Take it even if you feel better. It treats the infection and stops it from returning. Not taking all the medicine can make future infections hard to treat.    Keep the infected area clean.    When possible, raise the infected area above the level of your heart. This helps keep swelling down.    Talk with your healthcare provider if you are in pain. Ask what kind of over-the-counter medicine you can take for pain.    Apply clean bandages as advised.    Take your temperature once a day for a week.    Wash your hands often to prevent spreading the infection.  In the future, wash your hands before and after you touch cuts, scratches, or bandages. This will help prevent infection.   When to call your healthcare provider  Call your healthcare provider immediately if you have any of the following:    Difficulty or pain when moving the joints above or below the infected area    Discharge or pus draining from the area    Fever of 100.4 F (38 C) or higher, or as directed by your healthcare provider    Pain that gets worse in or around the infected     Redness that gets worse in or around the infected area, particularly if the area of redness expands to a wider area    Shaking chills    Swelling of the infected  area    Vomiting   Date Last Reviewed: 8/1/2016 2000-2017 [employer]. [address] [place] [address], [place] [address]. All rights reserved. This information is not intended as a substitute for professional medical care. Always follow your healthcare professional's instructions.      WOUND CARE: Daily dressing changes with xeroform or adaptic with kerlix over, no compression stockings until wound healed     24 Hour Appointment Hotline       To make an appointment at any [facility], call [phone] ([phone]). If you don't have a family doctor or clinic, we will help you find one. [facility] are conveniently located to serve the needs of you and your family.             Review of your medicines      START taking        Dose / Directions Last dose taken    doxycycline 100 MG capsule   Commonly known as:  VIBRAMYCIN   Dose:  100 mg   Quantity:  14 capsule        Take 1 capsule (100 mg) by mouth 2 times daily for 7 days   Refills:  0          Our records show that you are taking the medicines listed below. If these are incorrect, please call your family doctor or clinic.        Dose / Directions Last dose taken    ACETAMINOPHEN PO   Dose:  650 mg        Take 650 mg by mouth every 4 hours as needed for pain   Refills:  0        aspirin 325 MG EC tablet   Dose:  325 mg   Quantity:  30 tablet        Take 1 tablet (325 mg) by mouth daily   Refills:  0        ATORVASTATIN CALCIUM PO        Refills:  0        calcium-vitamin D 600-400 MG-UNIT per tablet   Commonly known as:  CALTRATE   Dose:  1 tablet        Take 1 tablet by mouth 2 times daily   Refills:  0        carboxymethylcellulose 0.5 % Soln ophthalmic solution   Commonly known as:  REFRESH PLUS   Dose:  1 drop        1 drop 4 times daily   Refills:  0        fluticasone 50 MCG/ACT spray   Commonly known as:  FLONASE   Dose:  2 spray        Spray 2 sprays into both nostrils daily   Refills:  0        LASIX PO   Dose:  20 mg        Take  20 mg by mouth daily   Refills:  0        MIRAPEX PO   Dose:  0.5 mg        Take 0.5 mg by mouth 3 times daily   Refills:  0        multivitamin, therapeutic with minerals Tabs tablet   Dose:  1 tablet        Take 1 tablet by mouth daily   Refills:  0        OMEGA-3 FISH OIL PO   Dose:  1 g        Take 1 g by mouth daily   Refills:  0        senna 8.6 MG tablet   Commonly known as:  SENOKOT   Dose:  1 tablet   Quantity:  10 tablet        Take 1 tablet by mouth daily   Refills:  0        TRAZODONE HCL PO   Dose:  100 mg        Take 100 mg by mouth At Bedtime   Refills:  0                Prescriptions were sent or printed at these locations (1 Prescription)                   Other Prescriptions                Printed at Department/Unit printer (1 of 1)         doxycycline (VIBRAMYCIN) 100 MG capsule                Procedures and tests performed during your visit     Basic metabolic panel    CBC with platelets differential    Lactic acid whole blood      Orders Needing Specimen Collection     None      Pending Results     No orders found from 4/1/2018 to 4/4/2018.            Pending Culture Results     No orders found from 4/1/2018 to 4/4/2018.            Pending Results Instructions     If you had any lab results that were not finalized at the time of your Discharge, you can call the ED Lab Result RN at [phone]. You will be contacted by this team for any positive Lab results or changes in treatment. The nurses are available 7 days a week from 10A to 6:30P.  You can leave a message 24 hours per day and they will return your call.        Test Results From Your Hospital Stay        4/3/2018 10:40 AM      Component Results     Component Value Ref Range & Units Status    WBC 9.5 4.0 - 11.0 10e9/L Final    RBC Count 3.68 (L) 3.8 - 5.2 10e12/L Final    Hemoglobin 10.4 (L) 11.7 - 15.7 g/dL Final    Hematocrit 32.1 (L) 35.0 - 47.0 % Final    MCV 87 78 - 100 fl Final    MCH 28.3 26.5 - 33.0 pg Final    MCHC 32.4 31.5 -  36.5 g/dL Final    RDW 14.3 10.0 - 15.0 % Final    Platelet Count 347 150 - 450 10e9/L Final    Diff Method Automated Method  Final    % Neutrophils 78.9 % Final    % Lymphocytes 9.5 % Final    % Monocytes 8.7 % Final    % Eosinophils 1.7 % Final    % Basophils 0.6 % Final    % Immature Granulocytes 0.6 % Final    Nucleated RBCs 0 0 /100 Final    Absolute Neutrophil 7.5 1.6 - 8.3 10e9/L Final    Absolute Lymphocytes 0.9 0.8 - 5.3 10e9/L Final    Absolute Monocytes 0.8 0.0 - 1.3 10e9/L Final    Absolute Eosinophils 0.2 0.0 - 0.7 10e9/L Final    Absolute Basophils 0.1 0.0 - 0.2 10e9/L Final    Abs Immature Granulocytes 0.1 0 - 0.4 10e9/L Final    Absolute Nucleated RBC 0.0  Final         4/3/2018 11:00 AM      Component Results     Component Value Ref Range & Units Status    Sodium 134 133 - 144 mmol/L Final    Potassium 4.2 3.4 - 5.3 mmol/L Final    Chloride 103 94 - 109 mmol/L Final    Carbon Dioxide 23 20 - 32 mmol/L Final    Anion Gap 8 3 - 14 mmol/L Final    Glucose 147 (H) 70 - 99 mg/dL Final    Urea Nitrogen 48 (H) 7 - 30 mg/dL Final    Creatinine 1.26 (H) 0.52 - 1.04 mg/dL Final    GFR Estimate 40 (L) >60 mL/min/1.7m2 Final    Non  GFR Calc    GFR Estimate If Black 49 (L) >60 mL/min/1.7m2 Final    African American GFR Calc    Calcium 9.1 8.5 - 10.1 mg/dL Final         4/3/2018 10:41 AM      Component Results     Component Value Ref Range & Units Status    Lactic Acid 0.9 0.7 - 2.0 mmol/L Final                Clinical Quality Measure: Blood Pressure Screening     Your blood pressure was checked while you were in the emergency department today. The last reading we obtained was  BP: (!) 137/93 . Please read the guidelines below about what these numbers mean and what you should do about them.  If your systolic blood pressure (the top number) is less than 120 and your diastolic blood pressure (the bottom number) is less than 80, then your blood pressure is normal. There is nothing more that you need  "to do about it.  If your systolic blood pressure (the top number) is 120-139 or your diastolic blood pressure (the bottom number) is 80-89, your blood pressure may be higher than it should be. You should have your blood pressure rechecked within a year by a primary care provider.  If your systolic blood pressure (the top number) is 140 or greater or your diastolic blood pressure (the bottom number) is 90 or greater, you may have high blood pressure. High blood pressure is treatable, but if left untreated over time it can put you at risk for heart attack, stroke, or kidney failure. You should have your blood pressure rechecked by a primary care provider within the next 4 weeks.  If your provider in the emergency department today gave you specific instructions to follow-up with your doctor or provider even sooner than that, you should follow that instruction and not wait for up to 4 weeks for your follow-up visit.        Thank you for choosing [facility]       Thank you for choosing [facility] for your care. Our goal is always to provide you with excellent care. Hearing back from our patients is one way we can continue to improve our services. Please take a few minutes to complete the written survey that you may receive in the mail after you visit with us. Thank you!        [facility] Information     [facility] lets you send messages to your doctor, view your test results, renew your prescriptions, schedule appointments and more. To sign up, go to [email or web address] . Click on \"Log in\" on the left side of the screen, which will take you to the Welcome page. Then click on \"Sign up Now\" on the right side of the page.     You will be asked to enter the access code listed below, as well as some personal information. Please follow the directions to create your username and password.     Your access code is: [email or web address]  Expires: 2018  2:38 PM     Your access code will  in 90 days. If you need help or a new code, " please call your [facility] clinic or [phone].        Care EveryWhere ID     This is your Care EveryWhere ID. This could be used by other organizations to access your [facility] medical records  [id number]        Equal Access to Services     CUCA IVORY : Lyudmila Gallego, warandida luqadaha, qaybta kaalmada norm, mindi swift. So [facility] [phone].    ATENCIÓN: Si habla español, tiene a newton disposición servicios gratuitos de asistencia lingüística. Llame al [phone].    We comply with applicable federal civil rights laws and [place] laws. We do not discriminate on the basis of race, color, national origin, age, disability, sex, sexual orientation, or gender identity.            After Visit Summary       This is your record. Keep this with you and show to your community pharmacist(s) and doctor(s) at your next visit.

## 2018-04-03 NOTE — ED NOTES
Spoke with FARZANEH Ornelas at [facility] there facility is not able to perform dressing changes. Referral for home health nurse needed. Dr. Darling updated.

## 2018-04-03 NOTE — ED AVS SNAPSHOT
[facility] Emergency Department    [address]    [facility] [address]    Phone:  [phone]    Fax:  [phone]                                       Radha Bunch   MRN: [id number]    Department:  [facility] Emergency Department   Date of Visit:  4/3/2018           After Visit Summary Signature Page     I have received my discharge instructions, and my questions have been answered. I have discussed any challenges I see with this plan with the nurse or doctor.    ..........................................................................................................................................  Patient/Patient Representative Signature      ..........................................................................................................................................  Patient Representative Print Name and Relationship to Patient    ..................................................               ................................................  Date                                            Time    ..........................................................................................................................................  Reviewed by Signature/Title    ...................................................              ..............................................  Date                                                            Time

## 2018-04-03 NOTE — ED PROVIDER NOTES
History     Chief Complaint:  Leg Swelling and Wound Check    HPI   Radha Bunch is a 86 year old female with a history of HTN, high cholesterol, CKD, diabetes mellitus, and arthritis who presents to the emergency department via EMS for evaluation of bilateral leg swelling and wound check. Of note, the patient states she was seen here for bilateral leg swelling and was diagnosed with ,lymphedema. She states they wrapped her lower legs during this visit. Since then, she states the redness and swelling has continued to worsen in the bilateral legs. She denies pain in the lower legs, fevers, and receiving any care for the lymphedema at her place of assisted living. The continuation of the worsening lymphedema prompted the patient to seek evaluation here in the emergency department.    Here, she states she normally sees Dr. Prieto in [place] for her care and reports she has an appointment with him tomorrow. She states she otherwise feels fine. She reports chest pain or shortness of breath if she walks to fast. She also reports intermittent right knee pain, which she states she has had replaced.     Allergies:  Codeine  Morphine  Cephalexin     Medications:    ATORVASTATIN   aspirin   senna  calcium-vitamin D   fluticasone   Furosemide   Pramipexole Dihydrochloride   carboxymethylcellulose   TRAZODONE     Past Medical History:    CKD  diabetes mellitus  High cholesterol  HTN  Osteoarthritis    Past Surgical History:    Right knee replacement  Left knee replacement  Right shoulder replacement  Cochlear device implant  Craniotomy, evacuate subdural hematoma  D and C  Orthopedic surgery: infected joint removed    Family History:    No past pertinent family history.    Social History:  Negative for tobacco use.  Positive for alcohol use: occasionally  Patient resides in a nursing home  Marital Status:        Review of Systems   Respiratory: Negative for shortness of breath.    Cardiovascular: Positive for  leg swelling. Negative for chest pain.   Skin: Positive for wound.   All other systems reviewed and are negative.    Physical Exam   First Vitals:  BP: (!) 137/93  Heart Rate: 94  Temp: 98.2  F (36.8  C)  SpO2: 94 %      Physical Exam  General: Patient is alert and interactive when I enter the room  Head:  The scalp, face, and head appear normal  Eyes:  Conjunctivae are normal  ENT:    The nose is normal    Pinnae are normal    External acoustic canals are normal  Neck:  Trachea midline  CV:  Pulses are normal  Resp:  No respiratory distress   Abdomen:      Soft, non-tender, non-distended  Musc:  Normal muscular tone    No major joint effusions    Bilateral non-pitting leg edema, multiple wounds on anterior shins, weeping present, left has mild surrounding erythema, no fluctuance, no induration, non-tender   Skin:  No rash or lesions noted  Neuro:  Speech is normal and fluent. Face is symmetric.     Moving all extremities well.   Psych: Awake. Alert.  Normal affect.  Appropriate interactions.    Emergency Department Course   Laboratory:  Lactic acid whole blood: 0.9  CBC: WBC: 9.5, HGB: 10.4 (L), PLT: 347  BMP: Glucose 147 (H), BUN 48 (H), Creatinine 1.26 (H), GFR 40 (L), o/w WNL    Emergency Department Course:  1015 Nursing notes and vitals reviewed.  I performed an exam of the patient as documented above.     IV inserted. Medicine administered as documented above. Blood drawn. This was sent to the lab for further testing, results above.    1130 I rechecked the patient and discussed the results of her workup thus far.     1237 I consulted with the wound care nurse regarding the patient's history and presentation here in the emergency department. I was inquiring about receiving some wound care instructions that could be past on the nursing home for outpatient care of the patient's wounds.     Wound care nurse presented at the bedside to provide the patient with instructions for home wound care.     1300  I rechecked  and updated the patient.    Findings and plan explained to the Patient. Patient discharged home with instructions regarding supportive care, medications, and reasons to return. The importance of close follow-up was reviewed. The patient was prescribed doxycycline.    I personally reviewed the laboratory results with the Patient and answered all related questions prior to discharge.   Impression & Plan    Medical Decision Making:  Radha Bunch is a 86 year old female who presents for evaluation of skin redness.  The history, physical exam and supporting data are consistent with cellulitis and lymphedema.  There do not appear at this time to be any complication of cellulitis including necrotizing fascitis, lymphangitis, abscess, osteomyelitis, sepsis, or shock.  The patient is not immunosuppressed.  She already had a negative DVT US on 03/21. Supportive outpatient management is indicated with antibiotics.  Close follow-up of primary care physician to ensure no progression and rapid resolution.  Cellulitis precautions for home.  The patient was sent home with wound care instructions per wound care nurses's instructions, who visited the patient at the bedside. I also put in an order for a home health nurse who will come to do the patient's dressing changes.   Critical Care time:  none    Diagnosis:    ICD-10-CM    1. Cellulitis of lower extremity, unspecified laterality L03.119    2. Lymphedema I89.0        Disposition:  discharged to home    Discharge Medications:  New Prescriptions    DOXYCYCLINE (VIBRAMYCIN) 100 MG CAPSULE    Take 1 capsule (100 mg) by mouth 2 times daily for 7 days       Jessica CHAN, am serving as a scribe on 4/3/2018 at 10:25 AM to personally document services performed by Genie Darling MD based on my observations and the provider's statements to me.     Jessica Barrientos  4/3/2018   [facility] EMERGENCY DEPARTMENT       Genie Darling MD  04/04/18 1433

## 2018-04-06 ENCOUNTER — HOSPITAL ENCOUNTER (OUTPATIENT)
Dept: OCCUPATIONAL THERAPY | Facility: CLINIC | Age: 83
Setting detail: THERAPIES SERIES
End: 2018-04-06
Attending: EMERGENCY MEDICINE
Payer: MEDICARE

## 2018-04-06 DIAGNOSIS — I89.0 LYMPHEDEMA: Primary | ICD-10-CM

## 2018-04-06 PROCEDURE — 97535 SELF CARE MNGMENT TRAINING: CPT | Mod: GO

## 2018-04-06 PROCEDURE — 97166 OT EVAL MOD COMPLEX 45 MIN: CPT | Mod: GO

## 2018-04-06 PROCEDURE — G8991 OTHER PT/OT GOAL STATUS: HCPCS | Mod: GO,CJ

## 2018-04-06 PROCEDURE — 40000445 ZZHC STATISTIC OT VISIT, LYMPHEDEMA

## 2018-04-06 PROCEDURE — G8990 OTHER PT/OT CURRENT STATUS: HCPCS | Mod: GO,CL

## 2018-04-08 NOTE — PROGRESS NOTES
[facility]        OUTPATIENT OCCUPATIONAL THERAPY EDEMA EVALUATION  PLAN OF TREATMENT FOR OUTPATIENT REHABILITATION  (COMPLETE FOR INITIAL CLAIMS ONLY)  Patient's Last Name, First Name, Radha Parsons                           Provider s Name:   [facility] Medical Record No.  [id number]     Start of Care Date:  04/06/18   Onset Date:  03/24/18   Type:  OT   Medical Diagnosis:  lymphedema; edema   Therapy Diagnosis:  lymphedema Visits from SOC:  1                                     __________________________________________________________________________________   Plan of Treatment/Functional Goals:    Manual lymph drainage, Gradient compression bandaging, Fit for compression garment, Exercises, Precautions to prevent infection / exacerbation, Education, Skin care / precautions, Home management program development        GOALS  1. Goal description: In order to improve wound healing, reduce skin infection prevelance, and promote better LB care Ind, by the completion of the intensive phase of services the pt and/or caregiver will demo:       Target date: 06/08/18  2. Goal description: tolerate gradient compression bandaging/wearing compression garments 23 hrs/day to prevent re-acccumulation of extracellular fluid for reductions needed to promote skin healing and reduce infection prevelance       Target date: 06/08/18  3. Goal description: demonstrate independence in applying gradient compression bandages to build I with home management BLE lymphedema for better LB care Ind and promote wound healing for infection prevention/reduction       Target date: 06/08/18  4. Goal description: demonstrate independence in performing prescribed exercises to facilate the muscle pumping systems and lymphatic system for max reductions needed for skin/wound healing and  promotion better LB care Ind       Target date: 06/08/18  5. Goal description: be independent in donning/doffing, wearing schedule, and care of compression garments for Ind building with home management BLE lymphedema for pormotion wound healing and reduction skin infection prevelance       Target date: 06/08/18  6. Goal description: Display total BLE volume reduciton of 1L+ for reducitons needed to aid eimprovements in LB dressing/shoe fit, promote wound healing, and improve balance with mobility tasks.       Target date: 06/08/18     7.             8.              Treatment frequency: 2 times / week, 3 times / week   Treatment duration: 3x/wk x 2 weeks, then 2x/wk x 1 week, then 0x/wk x 3 weeks, then 1x/wk x 1 week    Pa Jensen                                    I CERTIFY THE NEED FOR THESE SERVICES FURNISHED UNDER        THIS PLAN OF TREATMENT AND WHILE UNDER MY CARE     (Physician co-signature of this document indicates review and certification of the therapy plan).                   Certification date from: 04/06/18       Certification date to: 06/08/18           Referring physician: João Kumar MD   Initial Assessment  See Epic Evaluation- Start of care: 04/06/18

## 2018-04-08 NOTE — PROGRESS NOTES
04/06/18 1600   Quick Adds   Quick Adds Certification   Rehab Discipline   Discipline OT   Type of Visit   Type of visit Initial Edema Evaluation   General Information   Start of care 04/06/18   Referring physician João Kumar MD   Orders Evaluate and treat as indicated   Order date 03/24/18   Medical diagnosis edema; lymphedema   Onset of illness / date of surgery 03/24/18   Edema onset 03/24/18   Affected body parts LLE;RLE   Edema etiology TKA;Infection  ( diabetes; inactivity)   Edema etiology comments Pt is a poor historian and reports memory issues. Pt reports LE swelling approx 1 month, but chart indicates pt has baseline LE swelling. She recently sustained a cellultis infection, and has open wounds dressed and cared for via nursing services. Pt is diabetic, inactive, has HTN, hyperlipidemia, and CKD.   Pertinent history of current problem (PT: include personal factors and/or comorbidities that impact the POC; OT: include additional occupational profile info) PMH significant for arthritis, bowle and bladder issues, DM, HTN, BTKA, pregnancy, vision and hearing deficits, and weakness/energy loss   Surgical / medical history reviewed Yes   Prior level of functional mobility Mod I  (4ww)   Prior treatment Compression garments;Elevation   Community support Family / friend caregiver  (possible staff assist from [facility])   Patient role / employment history Retired;Disabled   Living environment Assisted living  (lives with )   Living environment comments Pt reports services at [facility] for her and her  for medications, meal prep, and light LE compression donning/dressing changes.   Current assistive devices (4ww)   Fall Risk Screen   Fall screen completed by OT   Have you fallen 2 or more times in the past year? Yes   Have you fallen and had an injury in the past year? No   Is patient a fall risk? Yes   Fall screen comments No Timed up and go 2/2 time constraints. Pt poor historian with memory issues  and unable to confirm information provided   System Outcome Measures   Lymphedema Life Impact Scale (score range 0-72). A higher score indicates greater impairment. 35   Patient / Family Goals   Patient / family goals statement swelling and can't fit into shoes; wound sopen and weeping   Pain   Patient currently in pain No   Cognitive Status   Orientation Person;Place   Level of consciousness Alert   Follows commands and answers questions 75% of the time   Personal safety and judgement Intact   Memory Impaired   Edema Exam / Assessment   Skin condition Pitting   Skin condition comments 3+ pitting ankle-knee crease; 1+ knee crease-mid thigh. Unable to visualize feet 2/2 dressings/tubigrip compression   Pitting 1+;3+   Pitting location BLE   Scar (BTKA scars noted)   Dorsal pedal pulse (unable to palpate; feet not visualized)   Stemmer sign Negative   Girth Measurements   Girth Measurements (will obtain upon return)   Range of Motion   ROM comments WFL   Strength   Strength comments NT'd   Activities of Daily Living   Activities of Daily Living I   Bed Mobility   Bed mobility I   Transfers   Transfers Mod I   Gait / Locomotion   Gait / Locomotion Mod I   Sensory   Sensory perception comments no neuropathy reported or identified   Coordination   Coordination Gross motor coordination appropriate   Muscle Tone   Muscle tone No deficits were identified   Planned Edema Interventions   Planned edema interventions Manual lymph drainage;Gradient compression bandaging;Fit for compression garment;Exercises;Precautions to prevent infection / exacerbation;Education;Skin care / precautions;Home management program development   Clinical Impression   Criteria for skilled therapeutic intervention met Yes   Therapy diagnosis lymphedema   Influenced by the following impairments / conditions Stage 2;Wounds / Ulcers   Assessment of Occupational Performance 3-5 Performance Deficits   Identified Performance Deficits LB clothing/shoes do  not fit; compromised mobility/balance; infection/open wounds need to heal   Clinical Decision Making (Complexity) Moderate complexity   Treatment frequency 2 times / week;3 times / week   Treatment duration 3x/wk x 2 weeks, then 2x/wk x 1 week, then 0x/wk x 3 weeks, then 1x/wk x 1 week   Patient / family and/or staff in agreement with plan of care Yes  (pt with memory issues; family member reports will return)   Risks and benefits of therapy have been explained Yes   Clinical impression comments Pt will benefit from skilled lymphedema services to reduce BLE swelling/lymphedema to promote wound healing, infection prevention, better fit/LB dressing I, and better baalnce with mobility tasks.   Goals   Edema Eval Goals 1;2;3;4;5;6   Goal 1   Goal identifier 1   Goal description In order to improve wound healing, reduce skin infection prevelance, and promote better LB care Ind, by the completion of the intensive phase of services the pt and/or caregiver will demo:   Target date 06/08/18   Goal 2   Goal identifier 1a   Goal description tolerate gradient compression bandaging/wearing compression garments 23 hrs/day to prevent re-acccumulation of extracellular fluid for reductions needed to promote skin healing and reduce infection prevelance   Target date 06/08/18   Goal 3   Goal identifier 1b   Goal description demonstrate independence in applying gradient compression bandages to build I with home management BLE lymphedema for better LB care Ind and promote wound healing for infection prevention/reduction   Target date 06/08/18   Goal 4   Goal identifier 1c   Goal description demonstrate independence in performing prescribed exercises to facilate the muscle pumping systems and lymphatic system for max reductions needed for skin/wound healing and promotion better LB care Ind   Target date 06/08/18   Goal 5   Goal identifier 1d   Goal description be independent in donning/doffing, wearing schedule, and care of compression  garments for Ind building with home management BLE lymphedema for pormotion wound healing and reduction skin infection prevelance   Target date 06/08/18   Goal 6   Goal identifier 2   Goal description Display total BLE volume reduciton of 1L+ for reducitons needed to aid eimprovements in LB dressing/shoe fit, promote wound healing, and improve balance with mobility tasks.   Target date 06/08/18   Total Evaluation Time   Total evaluation time 14   Certification   Certification date from 04/06/18   Certification date to 06/08/18   Medical Diagnosis lymphedema; edema   Certification I certify the need for these services furnished under this plan of treatment and while under my care.  (Physician co-signature of this document indicates review and certification of the therapy plan).

## 2018-04-12 ENCOUNTER — TELEPHONE (OUTPATIENT)
Dept: AUDIOLOGY | Facility: CLINIC | Age: 83
End: 2018-04-12

## 2018-04-12 NOTE — TELEPHONE ENCOUNTER
Patient emailed that getting steady red light error on [employer] Q90 processor.  RMA [id number] was submitted via [employer].  Replacement processor will ship to the patient.  She will send the faulty one back to [place] after removing Tmic, cable and headpiece.  The patient expressed understanding and agreement with this plan.      Hannah Chu, CCC-A  Licensed Audiologist  [place] #[id number]

## 2018-04-17 ENCOUNTER — HOSPITAL ENCOUNTER (OUTPATIENT)
Dept: OCCUPATIONAL THERAPY | Facility: CLINIC | Age: 83
Setting detail: THERAPIES SERIES
End: 2018-04-17
Attending: EMERGENCY MEDICINE
Payer: MEDICARE

## 2018-04-17 PROCEDURE — 40000445 ZZHC STATISTIC OT VISIT, LYMPHEDEMA

## 2018-04-17 PROCEDURE — 97535 SELF CARE MNGMENT TRAINING: CPT | Mod: GO

## 2018-04-19 ENCOUNTER — HOSPITAL ENCOUNTER (OUTPATIENT)
Dept: OCCUPATIONAL THERAPY | Facility: CLINIC | Age: 83
Setting detail: THERAPIES SERIES
End: 2018-04-19
Attending: EMERGENCY MEDICINE
Payer: MEDICARE

## 2018-04-19 PROCEDURE — 40000445 ZZHC STATISTIC OT VISIT, LYMPHEDEMA

## 2018-04-19 PROCEDURE — 97535 SELF CARE MNGMENT TRAINING: CPT | Mod: GO

## 2018-04-20 ENCOUNTER — HOSPITAL ENCOUNTER (OUTPATIENT)
Dept: OCCUPATIONAL THERAPY | Facility: CLINIC | Age: 83
Setting detail: THERAPIES SERIES
End: 2018-04-20
Attending: EMERGENCY MEDICINE
Payer: MEDICARE

## 2018-04-20 PROCEDURE — 97535 SELF CARE MNGMENT TRAINING: CPT | Mod: GO

## 2018-04-20 PROCEDURE — 40000445 ZZHC STATISTIC OT VISIT, LYMPHEDEMA

## 2018-04-24 ENCOUNTER — HOSPITAL ENCOUNTER (OUTPATIENT)
Dept: OCCUPATIONAL THERAPY | Facility: CLINIC | Age: 83
Setting detail: THERAPIES SERIES
End: 2018-04-24
Attending: EMERGENCY MEDICINE
Payer: MEDICARE

## 2018-04-24 PROCEDURE — 97110 THERAPEUTIC EXERCISES: CPT | Mod: GO

## 2018-04-24 PROCEDURE — 97535 SELF CARE MNGMENT TRAINING: CPT | Mod: GO

## 2018-04-24 PROCEDURE — 40000445 ZZHC STATISTIC OT VISIT, LYMPHEDEMA

## 2018-04-25 ENCOUNTER — HOSPITAL ENCOUNTER (OUTPATIENT)
Dept: OCCUPATIONAL THERAPY | Facility: CLINIC | Age: 83
Setting detail: THERAPIES SERIES
End: 2018-04-25
Attending: EMERGENCY MEDICINE
Payer: MEDICARE

## 2018-04-25 PROCEDURE — 97535 SELF CARE MNGMENT TRAINING: CPT | Mod: GO

## 2018-04-25 PROCEDURE — 40000445 ZZHC STATISTIC OT VISIT, LYMPHEDEMA

## 2018-04-26 ENCOUNTER — HOSPITAL ENCOUNTER (OUTPATIENT)
Dept: OCCUPATIONAL THERAPY | Facility: CLINIC | Age: 83
Setting detail: THERAPIES SERIES
End: 2018-04-26
Attending: EMERGENCY MEDICINE
Payer: MEDICARE

## 2018-04-26 PROCEDURE — 97535 SELF CARE MNGMENT TRAINING: CPT | Mod: GO

## 2018-04-26 PROCEDURE — 40000445 ZZHC STATISTIC OT VISIT, LYMPHEDEMA

## 2018-05-03 ENCOUNTER — HOSPITAL ENCOUNTER (OUTPATIENT)
Dept: OCCUPATIONAL THERAPY | Facility: CLINIC | Age: 83
Setting detail: THERAPIES SERIES
End: 2018-05-03
Attending: EMERGENCY MEDICINE
Payer: MEDICARE

## 2018-05-03 PROCEDURE — 40000445 ZZHC STATISTIC OT VISIT, LYMPHEDEMA

## 2018-05-03 PROCEDURE — 97535 SELF CARE MNGMENT TRAINING: CPT | Mod: GO

## 2018-05-09 NOTE — PROGRESS NOTES
[facility]      OUTPATIENT OCCUPATIONAL THERAPY  PLAN OF TREATMENT FOR OUTPATIENT REHABILITATION    Patient's Last Name, First Name, M.I.                YOB: 1931  Radha Bunch                        Provider's Name  [facility] Medical Record No.  [id number]                               Onset Date: 3/24/18   Start of Care Date: 4/6/18   Type:     ___PT   _X_OT   ___SLP Medical Diagnosis: edema; lymphedema                       OT Diagnosis: lymphedema      _________________________________________________________________________________  Plan of Treatment:    Frequency/Duration: 2x/week x 2 weeks     Goals:  Goal Identifier 1   Goal Description In order to improve wound healing, reduce skin infection prevelance, and promote better LB care Ind, by the completion of the intensive phase of services the pt and/or caregiver will demo:   Target Date 06/08/18   Date Met      Progress:     Goal Identifier 1a   Goal Description tolerate gradient compression bandaging/wearing compression garments 23 hrs/day to prevent re-acccumulation of extracellular fluid for reductions needed to promote skin healing and reduce infection prevelance   Target Date 06/08/18   Date Met  04/19/18   Progress:     Goal Identifier 1b   Goal Description demonstrate independence in applying gradient compression bandages to build I with home management BLE lymphedema for better LB care Ind and promote wound healing for infection prevention/reduction   Target Date 06/08/18   Date Met      Progress:     Goal Identifier 1c   Goal Description demonstrate independence in performing prescribed exercises to facilate the muscle pumping systems and lymphatic system for max reductions needed for skin/wound healing and promotion better LB care Ind   Target Date 06/08/18   Date Met  04/20/18   Progress:     Goal  Identifier 1d   Goal Description be independent in donning/doffing, wearing schedule, and care of compression garments for Ind building with home management BLE lymphedema for pormotion wound healing and reduction skin infection prevelance   Target Date 06/08/18   Date Met      Progress:     Goal Identifier 2   Goal Description Display total BLE volume reduciton of 1L+ for reducitons needed to aid eimprovements in LB dressing/shoe fit, promote wound healing, and improve balance with mobility tasks.   Target Date 06/08/18   Date Met  04/20/18   Progress:     Goal Identifier     Goal Description     Target Date     Date Met      Progress:     Goal Identifier     Goal Description     Target Date     Date Met      Progress:     Progress Toward Goals:   Progress limited due to support for pt's needs at [facility] has been limited. Pt's son was to assume duties with aiding pt with GCB use and compression use. The pt's son has struggled with back pain and limited clinic attendance/wrapping engagement. Pt's son has also struggled to follow laundering schedule limiting effects of GCB's when worn. Pt has also struggled with tolerance at times making reductions in BLE's suboptimal. Extra sessions needed to ensure I with GCB use and home management, along with transition to velcro compression wraps now that skin has healed and BLE's reducing.      Certification date from 5/9/18 to 6/8/18.    Pa Jensen I CERTIFY THE NEED FOR THESE SERVICES FURNISHED UNDER        THIS PLAN OF TREATMENT AND WHILE UNDER MY CARE     (Physician co-signature of this document indicates review and certification of the therapy plan).                Referring Provider: João Kumar MD

## 2018-05-09 NOTE — ADDENDUM NOTE
Encounter addended by: Pa Jensen OT on: 5/9/2018  7:32 AM<BR>     Actions taken: Sign clinical note, Document created

## 2018-06-29 ENCOUNTER — HOSPITAL ENCOUNTER (EMERGENCY)
Facility: CLINIC | Age: 83
Discharge: HOME | End: 2018-06-29
Attending: EMERGENCY MEDICINE | Admitting: EMERGENCY MEDICINE
Payer: MEDICARE

## 2018-06-29 VITALS
DIASTOLIC BLOOD PRESSURE: 52 MMHG | SYSTOLIC BLOOD PRESSURE: 122 MMHG | RESPIRATION RATE: 14 BRPM | HEART RATE: 87 BPM | OXYGEN SATURATION: 96 % | TEMPERATURE: 98.2 F

## 2018-06-29 DIAGNOSIS — I89.0 LYMPHEDEMA OF BOTH LOWER EXTREMITIES: ICD-10-CM

## 2018-06-29 LAB
ANION GAP SERPL CALCULATED.3IONS-SCNC: 7 MMOL/L (ref 3–14)
BASOPHILS # BLD AUTO: 0.1 10E9/L (ref 0–0.2)
BASOPHILS NFR BLD AUTO: 0.6 %
BUN SERPL-MCNC: 31 MG/DL (ref 7–30)
CALCIUM SERPL-MCNC: 8.5 MG/DL (ref 8.5–10.1)
CHLORIDE SERPL-SCNC: 95 MMOL/L (ref 94–109)
CO2 SERPL-SCNC: 26 MMOL/L (ref 20–32)
CREAT SERPL-MCNC: 1.26 MG/DL (ref 0.52–1.04)
DIFFERENTIAL METHOD BLD: ABNORMAL
EOSINOPHIL # BLD AUTO: 0.2 10E9/L (ref 0–0.7)
EOSINOPHIL NFR BLD AUTO: 2.3 %
ERYTHROCYTE [DISTWIDTH] IN BLOOD BY AUTOMATED COUNT: 14.4 % (ref 10–15)
GFR SERPL CREATININE-BSD FRML MDRD: 40 ML/MIN/1.7M2
GLUCOSE SERPL-MCNC: 162 MG/DL (ref 70–99)
HCT VFR BLD AUTO: 30.1 % (ref 35–47)
HGB BLD-MCNC: 9.6 G/DL (ref 11.7–15.7)
IMM GRANULOCYTES # BLD: 0.1 10E9/L (ref 0–0.4)
IMM GRANULOCYTES NFR BLD: 1.2 %
LYMPHOCYTES # BLD AUTO: 1 10E9/L (ref 0.8–5.3)
LYMPHOCYTES NFR BLD AUTO: 12.4 %
MCH RBC QN AUTO: 28.5 PG (ref 26.5–33)
MCHC RBC AUTO-ENTMCNC: 31.9 G/DL (ref 31.5–36.5)
MCV RBC AUTO: 89 FL (ref 78–100)
MONOCYTES # BLD AUTO: 0.9 10E9/L (ref 0–1.3)
MONOCYTES NFR BLD AUTO: 10.5 %
NEUTROPHILS # BLD AUTO: 6.2 10E9/L (ref 1.6–8.3)
NEUTROPHILS NFR BLD AUTO: 73 %
NRBC # BLD AUTO: 0 10*3/UL
NRBC BLD AUTO-RTO: 0 /100
PLATELET # BLD AUTO: 388 10E9/L (ref 150–450)
POTASSIUM SERPL-SCNC: 4.3 MMOL/L (ref 3.4–5.3)
RBC # BLD AUTO: 3.37 10E12/L (ref 3.8–5.2)
SODIUM SERPL-SCNC: 128 MMOL/L (ref 133–144)
WBC # BLD AUTO: 8.4 10E9/L (ref 4–11)

## 2018-06-29 PROCEDURE — 80048 BASIC METABOLIC PNL TOTAL CA: CPT | Performed by: EMERGENCY MEDICINE

## 2018-06-29 PROCEDURE — 99283 EMERGENCY DEPT VISIT LOW MDM: CPT

## 2018-06-29 PROCEDURE — 85025 COMPLETE CBC W/AUTO DIFF WBC: CPT | Performed by: EMERGENCY MEDICINE

## 2018-06-29 RX ORDER — DOXYCYCLINE 100 MG/1
100 CAPSULE ORAL 2 TIMES DAILY
Qty: 10 CAPSULE | Refills: 0 | Status: SHIPPED | OUTPATIENT
Start: 2018-06-29 | End: 2018-07-04

## 2018-06-29 RX ORDER — FUROSEMIDE 20 MG
40 TABLET ORAL DAILY
Qty: 6 TABLET | Refills: 0 | Status: SHIPPED | OUTPATIENT
Start: 2018-06-29 | End: 2018-07-02

## 2018-06-29 ASSESSMENT — ENCOUNTER SYMPTOMS
COLOR CHANGE: 1
FREQUENCY: 0
FEVER: 0
SHORTNESS OF BREATH: 0
NAUSEA: 0
VOMITING: 0
DIZZINESS: 0
ARTHRALGIAS: 1
HEMATURIA: 0
WOUND: 1
DIFFICULTY URINATING: 0
DIARRHEA: 0

## 2018-06-29 NOTE — ED NOTES
D/c instructions reviewed with pt and pt's daughter. Encouraged them to get abx filled and started ASAP. State they understand and have no questions at this time.

## 2018-06-29 NOTE — ED NOTES
New RN introduced self to pt and pt's daughter. Pt needs legs wrapped and then ready for d/c. RN informed pt she will make that happen ASAP. No other needs at this time.

## 2018-06-29 NOTE — ED NOTES
Bed: ED32  Expected date: 6/29/18  Expected time: 1:21 PM  Means of arrival: Ambulance  Comments:  HE- leg swelling

## 2018-06-29 NOTE — ED AVS SNAPSHOT
[facility] Emergency Department    [address]    [facility] [address]    Phone:  [phone]    Fax:  [phone]                                       Radha Bunch   MRN: [id number]    Department:  [facility] Emergency Department   Date of Visit:  6/29/2018           After Visit Summary Signature Page     I have received my discharge instructions, and my questions have been answered. I have discussed any challenges I see with this plan with the nurse or doctor.    ..........................................................................................................................................  Patient/Patient Representative Signature      ..........................................................................................................................................  Patient Representative Print Name and Relationship to Patient    ..................................................               ................................................  Date                                            Time    ..........................................................................................................................................  Reviewed by Signature/Title    ...................................................              ..............................................  Date                                                            Time

## 2018-06-29 NOTE — ED AVS SNAPSHOT
[facility] Emergency Department    [address] [place] [address]    Phone:  [phone]    Fax:  [phone]                                       Radha Bunch   MRN: [id number]    Department:  [facility] Emergency Department   Date of Visit:  6/29/2018           Patient Information     Date Of Birth          10/22/1931        Your diagnoses for this visit were:     Lymphedema of both lower extremities        You were seen by Alberto Tolentino MD.        Discharge Instructions       Please make an appointment to follow up with your primary care provider in 3 days .    Increase lasix for 3 days, your primary doctor can determine if we need to continue this higher dose for longer    Resume compression wraps from Home Care    Return to ER immediately if you develop: worsening swelling, redness, Fever > 101, persistent nausea or vomiting OR you have any other concerns about your health.          Lymphedema  The lymphatic system is made up of lymph vessels and lymph nodes, which carry a fluid called lymph. Lymph consists of waste from the cells. This fluid drains through lymph vessels under the skin to nearby lymph nodes. Lymph nodes filter waste products from the cells and kill any bacteria present before returning the lymph fluid to your blood circulation.  When the lymph vessels are damaged, lymph fluid cannot drain from tissues. This causes the lymph fluid to back up leading to swelling. This most often affects the arms or legs. Signs of lymphedema include heaviness, stiffness, or aching in an arm or leg. The limb may swell. The skin might look red. Shoes and rings may feel tight. Ankles and wrists might become less flexible.  The most common cause of damage to the lymph system is surgery or radiation for breast or testicular cancer. Other causes include repeated skin infections (cellulitis), burns, or injury to the arms or legs. It can take many years for  symptoms of lymphedema to appear. Once present, lymphedema can become a chronic condition. This means the problem can be managed but not cured.   Treatment often includes use of compression garments, massage, and special exercises. Talk to your healthcare provider about these therapies and best treatment plan for you.  Home care  You can help keep the condition from getting worse. Follow all instructions you have been given. Do your exercises and wear your compression garments as recommended. Also, care for yourself as instructed.     Small skin injuries like a cut, burn, or insect bite are more likely to cause a skin infection. Take special care to avoid injury. If you have any signs of infection, call your healthcare provider right away.    Take care of your skin and nails. Moisturize dry skin. Wear protective gloves when doing chores such as gardening.     Don't wear tight clothing or jewelry on the affected arm or leg. Avoid carrying bags or other weight on the affected arm.    Save with an electric razor instead of a razor blade.    If at all possible, don t have blood pressure taken, get injections, or have blood drawn in the affected arm.    If a leg is involved, don t cross your legs when sitting. Don't go barefoot.    Avoid hot tubs, steam rooms, and saunas.  If you are at risk for lymphedema but have not developed it, these tips can help also help prevent it. Follow your healthcare provider's instructions.  Follow-up care  Follow up with your healthcare provider, or as advised.  Lymphedema can change the appearance of your body. This can be emotionally difficult to adjust to. You may benefit from a support group where practical advice and emotional support is offered. Individual counseling is another option.  When to seek medical advice  Call your healthcare provider right away if any of these occur:    Swelling worsens    Rash, blistering, or other skin changes on the affected limb    Area of skin becomes  red, painful, or warm to the touch    A wound increases in pain, becomes warm, drains pus, or radiates red streaks    Fever of 100.4 F (38 C) or higher, or as directed by your healthcare provider  Date Last Reviewed: 10/1/2016    8103-3178 [employer]. [address] [place] [address]. All rights reserved. This information is not intended as a substitute for professional medical care. Always follow your healthcare professional's instructions.          Managing Lymphedema After Cancer    Lymphedema is a problem that may occur after cancer surgery when lymph nodes are removed, or after radiation to the lymph nodes. Lymphedema can occur months or even many years after cancer treatment. It is a chronic (ongoing) condition that has no cure. But steps can be taken to reduce or relieve symptoms. If left untreated, lymphedema can get worse. Treatment can lower your risk of infections and complications.  Understanding lymphedema  The lymphatic system helps the body fight infection. It is made up of a system of small vessels throughout the body. Lymph fluid travels through these vessels. Many tiny organs called lymph nodes are scattered through the system. These nodes filter lymph fluid. During surgery for cancer, nearby lymph nodes are often removed. Sometimes radiation is used to treat lymph nodes as part of cancer treatment. Both of these disrupt the flow of lymph fluid, which can lead to swelling. This is lymphedema. Lymphedema can affect one or both arms or legs, the genitals, or the belly, depending on the part of the body treated. Swelling can worsen and become severe. Skin sores or other problems can develop. Affected areas are also more likely to become infected.  Lymphedema treatment  There are no medications currently used to treat lymphedema. Instead, the most common treatment for lymphedema is complete decongestive therapy (CDT). This is a set of techniques used together to reduce your  symptoms. CDT is done by trained therapists. To help show how well the treatment is working, your arms or legs may be measured before and after CDT. The treatment most often involves one or more of the following:    Manual lymphatic drainage. This is a kind of massage that uses gentle pressure to help move lymph out of areas where it is collecting. It is done by a therapist or nurse with special training. It can also be learned and done at home.    Intermittent pneumatic compression. This uses a device to apply and relieve pressure to the arms or legs. Sleeves are put over the arms or legs. A pump fills the sleeves with air. Then the air is let out. This happens many times in a row.    Compression bandages. This means wearing stretchy or padded fabric on the parts of the body with lymphedema. This may include bandages, tape, or other types of compression wraps. They help support your tissues so lymph can flow more freely. And they help prevent fluid lymph from building up.    Therapeutic exercises. Some kinds of exercise may help your symptoms. These may include aerobic exercise, such as brisk walking. And may also include gradual weight-lifting exercises that build muscle.    Skin and nail care. Proper care of your skin and nails will help prevent infection. See the  Preventing Infection for Life  box for more information.    Compression garments. These are worn as often as needed, for life. These include sleeves, gloves, stockings, undershirt, or other types of special clothes. They compress parts of the body to help prevent lymph buildup. You may wear these during daily life, or at night when you re asleep.     Tips for living with lymphedema    Avoid becoming too cold or too hot. This can cause the skin to swell and dry out. It can also cause more fluid to build up. Be careful around hot objects to avoid burns. Don t use hot tubs, saunas, or a heating pad.    Avoid anything that squeezes the affected area. This  may cause more swelling. Wear loose clothing and jewelry. If your legs are affected, don t wear tight socks or undergarments, and don't  cross your legs when you sit. This can block lymph drainage.    Avoid weight gain. This can make your symptoms worse.    Inform health care providers. If your arms are affected, tell your health care providers about your lymphedema before getting shots, an IV, or having your blood pressure taken.    Call the doctor right awayt if you have  ? Fever of 100.4 F (38 C) or higher, or as directed by your healthcare provider  ? Signs of infection such as red blotches, warmth, or pain  ? Sudden increase in swelling   Preventing infection for life  An important part of staying healthy with lymphedema is preventing infections in the areas that are swollen. Lymphedema makes it easier for bacteria to grow in those areas. To help prevent infection:    Keep your skin clean, and moisturize it with lotion    Be extra careful when shaving, and use a clean razor on clean skin    Check your skin regularly for cuts, sores, bug bites, or other problems    Use an antibacterial ointment if you have a cut or sore    Don't pick at, or cut the skin around your fingernails. Use a cuticle stick to push your cuticles back.    Trim your fingernails and toenails straight across to prevent ingrown nails    If at all possible, don't allow blood to be taken or shots given in any affected limb    Prevent skin burns by wearing sunscreen and using gloves when cooking or doing household work    Wear shoes that fit well and do not cause blisters    Use an insect repellent to avoid bug bites when outdoors.   Date Last Reviewed: 9/24/2015 2000-2017 [employer]. [address] [place] [address]. All rights reserved. This information is not intended as a substitute for professional medical care. Always follow your healthcare professional's instructions.          24 Hour Appointment Hotline       To  make an appointment at any [facility] clinic, call [phone] ([phone]). If you don't have a family doctor or clinic, we will help you find one. [facility] clinics are conveniently located to serve the needs of you and your family.             Review of your medicines      START taking        Dose / Directions Last dose taken    doxycycline 100 MG capsule   Commonly known as:  VIBRAMYCIN   Dose:  100 mg   Quantity:  10 capsule        Take 1 capsule (100 mg) by mouth 2 times daily for 5 days   Refills:  0          CONTINUE these medicines which may have CHANGED, or have new prescriptions. If we are uncertain of the size of tablets/capsules you have at home, strength may be listed as something that might have changed.        Dose / Directions Last dose taken    furosemide 20 MG tablet   Commonly known as:  LASIX   Dose:  40 mg   What changed:  how much to take   Quantity:  6 tablet        Take 2 tablets (40 mg) by mouth daily for 3 days   Refills:  0          Our records show that you are taking the medicines listed below. If these are incorrect, please call your family doctor or clinic.        Dose / Directions Last dose taken    ACETAMINOPHEN PO   Dose:  650 mg        Take 650 mg by mouth every 4 hours as needed for pain   Refills:  0        aspirin 325 MG EC tablet   Dose:  325 mg   Quantity:  30 tablet        Take 1 tablet (325 mg) by mouth daily   Refills:  0        ATORVASTATIN CALCIUM PO        Refills:  0        calcium-vitamin D 600-400 MG-UNIT per tablet   Commonly known as:  CALTRATE   Dose:  1 tablet        Take 1 tablet by mouth 2 times daily   Refills:  0        carboxymethylcellulose 0.5 % Soln ophthalmic solution   Commonly known as:  REFRESH PLUS   Dose:  1 drop        1 drop 4 times daily   Refills:  0        fluticasone 50 MCG/ACT spray   Commonly known as:  FLONASE   Dose:  2 spray        Spray 2 sprays into both nostrils daily   Refills:  0        MIRAPEX PO   Dose:  0.5 mg        Take 0.5  mg by mouth 3 times daily   Refills:  0        OMEGA-3 FISH OIL PO   Dose:  1 g        Take 1 g by mouth daily   Refills:  0        order for DME   Quantity:  1 each        1: Gradient Compression Wraps; 2: Cast Boots; 3: BLE knee or thigh high custom compression garments (velcro or buckling); 4: BLE knee or thigh high compression stockings 20-30 or 30-40 mm Hg   Refills:  0        senna 8.6 MG tablet   Commonly known as:  SENOKOT   Dose:  1 tablet   Quantity:  10 tablet        Take 1 tablet by mouth daily   Refills:  0        TRAZODONE HCL PO   Dose:  100 mg        Take 100 mg by mouth At Bedtime   Refills:  0                Prescriptions were sent or printed at these locations (2 Prescriptions)                   Other Prescriptions                Printed at Department/Unit printer (2 of 2)         doxycycline (VIBRAMYCIN) 100 MG capsule               furosemide (LASIX) 20 MG tablet                Procedures and tests performed during your visit     Basic metabolic panel    CBC with platelets differential      Orders Needing Specimen Collection     None      Pending Results     No orders found from 6/27/2018 to 6/30/2018.            Pending Culture Results     No orders found from 6/27/2018 to 6/30/2018.            Pending Results Instructions     If you had any lab results that were not finalized at the time of your Discharge, you can call the ED Lab Result RN at [phone]. You will be contacted by this team for any positive Lab results or changes in treatment. The nurses are available 7 days a week from 10A to 6:30P.  You can leave a message 24 hours per day and they will return your call.        Test Results From Your Hospital Stay        6/29/2018  2:23 PM      Component Results     Component Value Ref Range & Units Status    WBC 8.4 4.0 - 11.0 10e9/L Final    RBC Count 3.37 (L) 3.8 - 5.2 10e12/L Final    Hemoglobin 9.6 (L) 11.7 - 15.7 g/dL Final    Hematocrit 30.1 (L) 35.0 - 47.0 % Final    MCV 89 78 - 100  fl Final    MCH 28.5 26.5 - 33.0 pg Final    MCHC 31.9 31.5 - 36.5 g/dL Final    RDW 14.4 10.0 - 15.0 % Final    Platelet Count 388 150 - 450 10e9/L Final    Diff Method Automated Method  Final    % Neutrophils 73.0 % Final    % Lymphocytes 12.4 % Final    % Monocytes 10.5 % Final    % Eosinophils 2.3 % Final    % Basophils 0.6 % Final    % Immature Granulocytes 1.2 % Final    Nucleated RBCs 0 0 /100 Final    Absolute Neutrophil 6.2 1.6 - 8.3 10e9/L Final    Absolute Lymphocytes 1.0 0.8 - 5.3 10e9/L Final    Absolute Monocytes 0.9 0.0 - 1.3 10e9/L Final    Absolute Eosinophils 0.2 0.0 - 0.7 10e9/L Final    Absolute Basophils 0.1 0.0 - 0.2 10e9/L Final    Abs Immature Granulocytes 0.1 0 - 0.4 10e9/L Final    Absolute Nucleated RBC 0.0  Final         6/29/2018  2:33 PM      Component Results     Component Value Ref Range & Units Status    Sodium 128 (L) 133 - 144 mmol/L Final    Potassium 4.3 3.4 - 5.3 mmol/L Final    Chloride 95 94 - 109 mmol/L Final    Carbon Dioxide 26 20 - 32 mmol/L Final    Anion Gap 7 3 - 14 mmol/L Final    Glucose 162 (H) 70 - 99 mg/dL Final    Urea Nitrogen 31 (H) 7 - 30 mg/dL Final    Creatinine 1.26 (H) 0.52 - 1.04 mg/dL Final    GFR Estimate 40 (L) >60 mL/min/1.7m2 Final    Non  GFR Calc    GFR Estimate If Black 49 (L) >60 mL/min/1.7m2 Final    African American GFR Calc    Calcium 8.5 8.5 - 10.1 mg/dL Final                Clinical Quality Measure: Blood Pressure Screening     Your blood pressure was checked while you were in the emergency department today. The last reading we obtained was  BP: 104/53 . Please read the guidelines below about what these numbers mean and what you should do about them.  If your systolic blood pressure (the top number) is less than 120 and your diastolic blood pressure (the bottom number) is less than 80, then your blood pressure is normal. There is nothing more that you need to do about it.  If your systolic blood pressure (the top number) is  "120-139 or your diastolic blood pressure (the bottom number) is 80-89, your blood pressure may be higher than it should be. You should have your blood pressure rechecked within a year by a primary care provider.  If your systolic blood pressure (the top number) is 140 or greater or your diastolic blood pressure (the bottom number) is 90 or greater, you may have high blood pressure. High blood pressure is treatable, but if left untreated over time it can put you at risk for heart attack, stroke, or kidney failure. You should have your blood pressure rechecked by a primary care provider within the next 4 weeks.  If your provider in the emergency department today gave you specific instructions to follow-up with your doctor or provider even sooner than that, you should follow that instruction and not wait for up to 4 weeks for your follow-up visit.        Thank you for choosing [facility]       Thank you for choosing [facility] for your care. Our goal is always to provide you with excellent care. Hearing back from our patients is one way we can continue to improve our services. Please take a few minutes to complete the written survey that you may receive in the mail after you visit with us. Thank you!        [employer] Information     [employer] lets you send messages to your doctor, view your test results, renew your prescriptions, schedule appointments and more. To sign up, go to [email or web address] . Click on \"Log in\" on the left side of the screen, which will take you to the Welcome page. Then click on \"Sign up Now\" on the right side of the page.     You will be asked to enter the access code listed below, as well as some personal information. Please follow the directions to create your username and password.     Your access code is: [email or web address]  Expires: 2018  2:43 PM     Your access code will  in 90 days. If you need help or a new code, please call your [facility] clinic or [phone].        Care EveryWhere " ID     This is your Care EveryWhere ID. This could be used by other organizations to access your [facility] medical records  [id number]        Equal Access to Services     CUCA IVORY : Lyudmila Gallego, jonatan baer, yaneth zheng, mindi swift. So [facility] [phone].    ATENCIÓN: Si habla español, tiene a newton disposición servicios gratuitos de asistencia lingüística. Llame al [phone].    We comply with applicable federal civil rights laws and [place] laws. We do not discriminate on the basis of race, color, national origin, age, disability, sex, sexual orientation, or gender identity.            After Visit Summary       This is your record. Keep this with you and show to your community pharmacist(s) and doctor(s) at your next visit.

## 2018-06-29 NOTE — ED TRIAGE NOTES
Pt from assisted living, getting lower legs wrapped by home health nurse, now worsening swelling and weeping, denies fevers. ABC's intact, alert and oriented X3. Pt [place].

## 2018-06-29 NOTE — ED PROVIDER NOTES
History     Chief Complaint:  Leg Swelling     HPI   Radha Bunch is a 86 year old female who presents with leg swelling. The patient is brought in from assisted living facility where she has been receiving home health nurse care for bilateral sores to her lower extremities. The health nurse normally wraps the patient's legs to help these wounds, but has not done so recently. Patient's legs are having increasing swelling and patient has also developed some right sided leg pain. She denies any urinary symptoms, bowel changes, fevers, numbness, weakness, fevers, or any other symptoms. Patient's daughter is at bedside on presentation.    Allergies:  Codeine  Morphine  Cephalexin     Medications:    Acetaminophen   Aspirin  Atorvastatin  Carboxymethylcellulose  Flonase  Lasix  Mirapex  Senokot  Trazodone     Past Medical History:    Chronic kidney disease  Diabetes  Hyperlipidemia  Hypertension   Osteoarthritis    Past Surgical History:    Bilateral knee arthroplasty  Cochlear implant, right ear  Cystectomy of breast, right  Shoulder arthroplasty  Bilateral cataract  Craniotomy  D&C  Infected joint removal     Family History:    No pertinent family history.    Social History:  Smoking status: Never smoker  Alcohol use: Yes  Marital Status:        Review of Systems   Constitutional: Negative for fever.   Respiratory: Negative for shortness of breath.    Gastrointestinal: Negative for diarrhea, nausea and vomiting.   Genitourinary: Negative for difficulty urinating, frequency and hematuria.   Musculoskeletal: Positive for arthralgias. Negative for gait problem.   Skin: Positive for color change and wound.   Neurological: Negative for dizziness.   All other systems reviewed and are negative.    Physical Exam     Patient Vitals for the past 24 hrs:   BP Temp Temp src Pulse Heart Rate Resp SpO2   06/29/18 1335 124/44 98.2  F (36.8  C) Oral 87 87 18 93 %         Physical Exam   HENT:   Right Ear: External ear  normal.   Left Ear: External ear normal.   Nose: Nose normal.   Eyes: Right eye exhibits no discharge. Left eye exhibits no discharge. No scleral icterus.   Cardiovascular: Intact distal pulses.  Exam reveals no friction rub.    No murmur heard.  Pulmonary/Chest: Effort normal. No respiratory distress. She has no wheezes. She has no rales.   Speaking in full sentences   Musculoskeletal:   4+ edema bilateral lower extremities distal to the knee on the distal part of the lower leg there is near circumferential erythema with numerous areas of superficial skin breakdown and serous drainage.  Distal pulses are palpable plantar flexion dorsiflexion 5 out of 5 sensation intact to light touch   Neurological:   Alert    No gross motor or sensory deficits   Skin: Skin is warm.   Psychiatric: She has a normal mood and affect. Judgment normal.   Nursing note and vitals reviewed.        Emergency Department Course   Laboratory:  CBC:  WBC 8.4, HGB 9.6 (L), ,  BMP:  (L), Glucose 162 (H), BUN 31 (H), Creatinine 1.26 (H), GFR 40 (L), otherwise WNL    Emergency Department Course:  Nursing notes and vitals reviewed.  (1495) I performed an exam of the patient as documented above.    Blood was drawn from the patient. This was sent for laboratory testing, findings above.     Findings and plan explained to the patient. Patient discharged home with instructions regarding supportive care, medications, and reasons to return. The importance of close follow-up was reviewed. The patient was prescribed Vibramycin.    Impression & Plan    Medical Decision Making:  Radha Bunch is a 86 year old female here with bilateral lower extremity edema, acute on chronic with associated skin breakdown, weeping. At risk for soft tissue infection. Increase her Lasix for the next 3 days and follow-up with her primary clinic to determine need for continued higher dose.. Get her back with lymphedema clinic for leg wraps and antibiotics for  possible cellulitis.    Diagnosis:    ICD-10-CM   1. Lymphedema of both lower extremities I89.0       Disposition:  Patient is discharged to home.      Discharge Medications:  New Prescriptions    DOXYCYCLINE (VIBRAMYCIN) 100 MG CAPSULE    Take 1 capsule (100 mg) by mouth 2 times daily for 5 days         Erick CHAN, hannah serving as a scribe on 6/29/2018 at 1:36 PM to personally document services performed by Dr. Tolentino based on my observations and the provider's statements to me.        Erick Rios  6/29/2018   [facility] EMERGENCY DEPARTMENT       Alberto Tolentino MD  06/29/18 1800

## 2018-06-29 NOTE — DISCHARGE INSTRUCTIONS
Please make an appointment to follow up with your primary care provider in 3 days .    Increase lasix for 3 days, your primary doctor can determine if we need to continue this higher dose for longer    Resume compression wraps from Home Care    Return to ER immediately if you develop: worsening swelling, redness, Fever > 101, persistent nausea or vomiting OR you have any other concerns about your health.          Lymphedema  The lymphatic system is made up of lymph vessels and lymph nodes, which carry a fluid called lymph. Lymph consists of waste from the cells. This fluid drains through lymph vessels under the skin to nearby lymph nodes. Lymph nodes filter waste products from the cells and kill any bacteria present before returning the lymph fluid to your blood circulation.  When the lymph vessels are damaged, lymph fluid cannot drain from tissues. This causes the lymph fluid to back up leading to swelling. This most often affects the arms or legs. Signs of lymphedema include heaviness, stiffness, or aching in an arm or leg. The limb may swell. The skin might look red. Shoes and rings may feel tight. Ankles and wrists might become less flexible.  The most common cause of damage to the lymph system is surgery or radiation for breast or testicular cancer. Other causes include repeated skin infections (cellulitis), burns, or injury to the arms or legs. It can take many years for symptoms of lymphedema to appear. Once present, lymphedema can become a chronic condition. This means the problem can be managed but not cured.   Treatment often includes use of compression garments, massage, and special exercises. Talk to your healthcare provider about these therapies and best treatment plan for you.  Home care  You can help keep the condition from getting worse. Follow all instructions you have been given. Do your exercises and wear your compression garments as recommended. Also, care for yourself as instructed.     Small  skin injuries like a cut, burn, or insect bite are more likely to cause a skin infection. Take special care to avoid injury. If you have any signs of infection, call your healthcare provider right away.    Take care of your skin and nails. Moisturize dry skin. Wear protective gloves when doing chores such as gardening.     Don't wear tight clothing or jewelry on the affected arm or leg. Avoid carrying bags or other weight on the affected arm.    Save with an electric razor instead of a razor blade.    If at all possible, don t have blood pressure taken, get injections, or have blood drawn in the affected arm.    If a leg is involved, don t cross your legs when sitting. Don't go barefoot.    Avoid hot tubs, steam rooms, and saunas.  If you are at risk for lymphedema but have not developed it, these tips can help also help prevent it. Follow your healthcare provider's instructions.  Follow-up care  Follow up with your healthcare provider, or as advised.  Lymphedema can change the appearance of your body. This can be emotionally difficult to adjust to. You may benefit from a support group where practical advice and emotional support is offered. Individual counseling is another option.  When to seek medical advice  Call your healthcare provider right away if any of these occur:    Swelling worsens    Rash, blistering, or other skin changes on the affected limb    Area of skin becomes red, painful, or warm to the touch    A wound increases in pain, becomes warm, drains pus, or radiates red streaks    Fever of 100.4 F (38 C) or higher, or as directed by your healthcare provider  Date Last Reviewed: 10/1/2016    9540-9683 [employer]. [address] [place] [address], [place] [address]. All rights reserved. This information is not intended as a substitute for professional medical care. Always follow your healthcare professional's instructions.          Managing Lymphedema After Cancer    Lymphedema is a problem that  may occur after cancer surgery when lymph nodes are removed, or after radiation to the lymph nodes. Lymphedema can occur months or even many years after cancer treatment. It is a chronic (ongoing) condition that has no cure. But steps can be taken to reduce or relieve symptoms. If left untreated, lymphedema can get worse. Treatment can lower your risk of infections and complications.  Understanding lymphedema  The lymphatic system helps the body fight infection. It is made up of a system of small vessels throughout the body. Lymph fluid travels through these vessels. Many tiny organs called lymph nodes are scattered through the system. These nodes filter lymph fluid. During surgery for cancer, nearby lymph nodes are often removed. Sometimes radiation is used to treat lymph nodes as part of cancer treatment. Both of these disrupt the flow of lymph fluid, which can lead to swelling. This is lymphedema. Lymphedema can affect one or both arms or legs, the genitals, or the belly, depending on the part of the body treated. Swelling can worsen and become severe. Skin sores or other problems can develop. Affected areas are also more likely to become infected.  Lymphedema treatment  There are no medications currently used to treat lymphedema. Instead, the most common treatment for lymphedema is complete decongestive therapy (CDT). This is a set of techniques used together to reduce your symptoms. CDT is done by trained therapists. To help show how well the treatment is working, your arms or legs may be measured before and after CDT. The treatment most often involves one or more of the following:    Manual lymphatic drainage. This is a kind of massage that uses gentle pressure to help move lymph out of areas where it is collecting. It is done by a therapist or nurse with special training. It can also be learned and done at home.    Intermittent pneumatic compression. This uses a device to apply and relieve pressure to the  arms or legs. Sleeves are put over the arms or legs. A pump fills the sleeves with air. Then the air is let out. This happens many times in a row.    Compression bandages. This means wearing stretchy or padded fabric on the parts of the body with lymphedema. This may include bandages, tape, or other types of compression wraps. They help support your tissues so lymph can flow more freely. And they help prevent fluid lymph from building up.    Therapeutic exercises. Some kinds of exercise may help your symptoms. These may include aerobic exercise, such as brisk walking. And may also include gradual weight-lifting exercises that build muscle.    Skin and nail care. Proper care of your skin and nails will help prevent infection. See the  Preventing Infection for Life  box for more information.    Compression garments. These are worn as often as needed, for life. These include sleeves, gloves, stockings, undershirt, or other types of special clothes. They compress parts of the body to help prevent lymph buildup. You may wear these during daily life, or at night when you re asleep.     Tips for living with lymphedema    Avoid becoming too cold or too hot. This can cause the skin to swell and dry out. It can also cause more fluid to build up. Be careful around hot objects to avoid burns. Don t use hot tubs, saunas, or a heating pad.    Avoid anything that squeezes the affected area. This may cause more swelling. Wear loose clothing and jewelry. If your legs are affected, don t wear tight socks or undergarments, and don't  cross your legs when you sit. This can block lymph drainage.    Avoid weight gain. This can make your symptoms worse.    Inform health care providers. If your arms are affected, tell your health care providers about your lymphedema before getting shots, an IV, or having your blood pressure taken.    Call the doctor right awayt if you have  ? Fever of 100.4 F (38 C) or higher, or as directed by your  healthcare provider  ? Signs of infection such as red blotches, warmth, or pain  ? Sudden increase in swelling   Preventing infection for life  An important part of staying healthy with lymphedema is preventing infections in the areas that are swollen. Lymphedema makes it easier for bacteria to grow in those areas. To help prevent infection:    Keep your skin clean, and moisturize it with lotion    Be extra careful when shaving, and use a clean razor on clean skin    Check your skin regularly for cuts, sores, bug bites, or other problems    Use an antibacterial ointment if you have a cut or sore    Don't pick at, or cut the skin around your fingernails. Use a cuticle stick to push your cuticles back.    Trim your fingernails and toenails straight across to prevent ingrown nails    If at all possible, don't allow blood to be taken or shots given in any affected limb    Prevent skin burns by wearing sunscreen and using gloves when cooking or doing household work    Wear shoes that fit well and do not cause blisters    Use an insect repellent to avoid bug bites when outdoors.   Date Last Reviewed: 9/24/2015 2000-2017 [employer]. [address] [place] [address], [place] [address]. All rights reserved. This information is not intended as a substitute for professional medical care. Always follow your healthcare professional's instructions.

## 2018-08-02 ENCOUNTER — RECORDS - HEALTHEAST (OUTPATIENT)
Dept: LAB | Facility: CLINIC | Age: 83
End: 2018-08-02

## 2018-08-03 LAB
25(OH)D3 SERPL-MCNC: 40.3 NG/ML (ref 30–80)
ALBUMIN SERPL-MCNC: 3 G/DL (ref 3.5–5)
ALP SERPL-CCNC: 145 U/L (ref 45–120)
ALT SERPL W P-5'-P-CCNC: 29 U/L (ref 0–45)
ANION GAP SERPL CALCULATED.3IONS-SCNC: 9 MMOL/L (ref 5–18)
AST SERPL W P-5'-P-CCNC: 32 U/L (ref 0–40)
BILIRUB SERPL-MCNC: 0.4 MG/DL (ref 0–1)
BUN SERPL-MCNC: 24 MG/DL (ref 8–28)
C REACTIVE PROTEIN LHE: 2.9 MG/DL (ref 0–0.8)
CALCIUM SERPL-MCNC: 8.6 MG/DL (ref 8.5–10.5)
CHLORIDE BLD-SCNC: 97 MMOL/L (ref 98–107)
CO2 SERPL-SCNC: 25 MMOL/L (ref 22–31)
CREAT SERPL-MCNC: 1.1 MG/DL (ref 0.6–1.1)
ERYTHROCYTE [DISTWIDTH] IN BLOOD BY AUTOMATED COUNT: 14.2 % (ref 11–14.5)
ERYTHROCYTE [SEDIMENTATION RATE] IN BLOOD BY WESTERGREN METHOD: 48 MM/HR (ref 0–20)
FERRITIN SERPL-MCNC: 245 NG/ML (ref 10–130)
GFR SERPL CREATININE-BSD FRML MDRD: 47 ML/MIN/1.73M2
GLUCOSE BLD-MCNC: 106 MG/DL (ref 70–125)
HBA1C MFR BLD: 6.2 % (ref 4.2–6.1)
HCT VFR BLD AUTO: 28.9 % (ref 35–47)
HGB BLD-MCNC: 9.5 G/DL (ref 12–16)
IRON SATN MFR SERPL: 16 % (ref 20–50)
IRON SERPL-MCNC: 33 UG/DL (ref 42–175)
MAGNESIUM SERPL-MCNC: 1.8 MG/DL (ref 1.8–2.6)
MCH RBC QN AUTO: 28.8 PG (ref 27–34)
MCHC RBC AUTO-ENTMCNC: 32.9 G/DL (ref 32–36)
MCV RBC AUTO: 88 FL (ref 80–100)
PLATELET # BLD AUTO: 319 THOU/UL (ref 140–440)
PMV BLD AUTO: 9 FL (ref 8.5–12.5)
POTASSIUM BLD-SCNC: 4 MMOL/L (ref 3.5–5)
PROT SERPL-MCNC: 6.4 G/DL (ref 6–8)
RBC # BLD AUTO: 3.3 MILL/UL (ref 3.8–5.4)
SODIUM SERPL-SCNC: 131 MMOL/L (ref 136–145)
TIBC SERPL-MCNC: 204 UG/DL (ref 313–563)
TRANSFERRIN SERPL-MCNC: 163 MG/DL (ref 212–360)
TSH SERPL DL<=0.005 MIU/L-ACNC: 4.62 UIU/ML (ref 0.3–5)
VIT B12 SERPL-MCNC: 682 PG/ML (ref 213–816)
WBC: 5.9 THOU/UL (ref 4–11)

## 2018-08-08 ENCOUNTER — RECORDS - HEALTHEAST (OUTPATIENT)
Dept: LAB | Facility: CLINIC | Age: 83
End: 2018-08-08

## 2018-08-08 LAB
BASOPHILS # BLD AUTO: 0 THOU/UL (ref 0–0.2)
BASOPHILS NFR BLD AUTO: 0 % (ref 0–2)
EOSINOPHIL # BLD AUTO: 0.3 THOU/UL (ref 0–0.4)
EOSINOPHIL NFR BLD AUTO: 4 % (ref 0–6)
ERYTHROCYTE [DISTWIDTH] IN BLOOD BY AUTOMATED COUNT: 14 % (ref 11–14.5)
HCT VFR BLD AUTO: 29.6 % (ref 35–47)
HGB BLD-MCNC: 9.6 G/DL (ref 12–16)
LYMPHOCYTES # BLD AUTO: 1.6 THOU/UL (ref 0.8–4.4)
LYMPHOCYTES NFR BLD AUTO: 23 % (ref 20–40)
MCH RBC QN AUTO: 28.7 PG (ref 27–34)
MCHC RBC AUTO-ENTMCNC: 32.4 G/DL (ref 32–36)
MCV RBC AUTO: 89 FL (ref 80–100)
MONOCYTES # BLD AUTO: 0.7 THOU/UL (ref 0–0.9)
MONOCYTES NFR BLD AUTO: 10 % (ref 2–10)
NEUTROPHILS # BLD AUTO: 4.3 THOU/UL (ref 2–7.7)
NEUTROPHILS NFR BLD AUTO: 63 % (ref 50–70)
PLATELET # BLD AUTO: 319 THOU/UL (ref 140–440)
PMV BLD AUTO: 8.8 FL (ref 8.5–12.5)
RBC # BLD AUTO: 3.34 MILL/UL (ref 3.8–5.4)
WBC: 7 THOU/UL (ref 4–11)

## 2018-08-13 ENCOUNTER — RECORDS - HEALTHEAST (OUTPATIENT)
Dept: LAB | Facility: CLINIC | Age: 83
End: 2018-08-13

## 2018-08-13 LAB
ANION GAP SERPL CALCULATED.3IONS-SCNC: 10 MMOL/L (ref 5–18)
BUN SERPL-MCNC: 23 MG/DL (ref 8–28)
CALCIUM SERPL-MCNC: 9 MG/DL (ref 8.5–10.5)
CHLORIDE BLD-SCNC: 95 MMOL/L (ref 98–107)
CO2 SERPL-SCNC: 25 MMOL/L (ref 22–31)
CREAT SERPL-MCNC: 1.05 MG/DL (ref 0.6–1.1)
GFR SERPL CREATININE-BSD FRML MDRD: 50 ML/MIN/1.73M2
GLUCOSE BLD-MCNC: 110 MG/DL (ref 70–125)
POTASSIUM BLD-SCNC: 4.9 MMOL/L (ref 3.5–5)
SODIUM SERPL-SCNC: 130 MMOL/L (ref 136–145)

## 2018-08-29 ENCOUNTER — RECORDS - HEALTHEAST (OUTPATIENT)
Dept: LAB | Facility: CLINIC | Age: 83
End: 2018-08-29

## 2018-08-29 LAB
C DIFF TOX B STL QL: NEGATIVE
RIBOTYPE 027/NAP1/BI: NORMAL

## 2018-09-03 NOTE — ADDENDUM NOTE
Encounter addended by: Pa Jensen OT on: 9/3/2018 11:28 AM<BR>     Actions taken: Sign clinical note, Episode resolved

## 2018-09-03 NOTE — PROGRESS NOTES
Outpatient Occupational Therapy Discharge Note     Patient: Radha Bunch  : 10/22/1931    Beginning/End Dates of Reporting Period:  18 to 9/3/2018    Referring Provider: João Kumar MD    Therapy Diagnosis: edema; lymphedema    Client Self Report:       Objective Measurements: see flowsheet                                                        Outcome Measures (most recent score): no LLIS; pt did not return for services       Goals:   Goal Identifier 1   Goal Description In order to improve wound healing, reduce skin infection prevelance, and promote better LB care Ind, by the completion of the intensive phase of services the pt and/or caregiver will demo:   Target Date 18   Date Met      Progress:     Goal Identifier 1a   Goal Description tolerate gradient compression bandaging/wearing compression garments 23 hrs/day to prevent re-acccumulation of extracellular fluid for reductions needed to promote skin healing and reduce infection prevelance   Target Date 18   Date Met  18   Progress:     Goal Identifier 1b   Goal Description demonstrate independence in applying gradient compression bandages to build I with home management BLE lymphedema for better LB care Ind and promote wound healing for infection prevention/reduction   Target Date 18   Date Met      Progress:     Goal Identifier 1c   Goal Description demonstrate independence in performing prescribed exercises to facilate the muscle pumping systems and lymphatic system for max reductions needed for skin/wound healing and promotion better LB care Ind   Target Date 18   Date Met  18   Progress:     Goal Identifier 1d   Goal Description be independent in donning/doffing, wearing schedule, and care of compression garments for Ind building with home management BLE lymphedema for pormotion wound healing and reduction skin infection prevelance   Target Date 18   Date Met      Progress:     Goal  Identifier 2   Goal Description Display total BLE volume reduciton of 1L+ for reducitons needed to aid eimprovements in LB dressing/shoe fit, promote wound healing, and improve balance with mobility tasks.   Target Date 06/08/18   Date Met  04/20/18   Progress:     Goal Identifier     Goal Description     Target Date     Date Met      Progress:     Goal Identifier     Goal Description     Target Date     Date Met      Progress:     Progress Toward Goals:   Progress limited due to support from pts family and compliance with home use steps CDT therapy. Pt made reductions in BLE edema/lymphedema with use of GCB's, but was reliant on her son/daughter to karen and doff wraps as they lived together and pts physical limitations. Son/daughter was inconsistent with clinic attendance to aide pt, had LBP issues limiting their help with pt needs, would not show up for appts, and did not follow instructions with laundering and home use of GCB's. Patterns changed to a simplified way of wrapping to boost compliance and overall efficiency at home with son/daughter support. Pt was also to learn and get exposure to velcro compression, but pt and son/daughter have not returned to finish services.        Plan:  Discharge from therapy.    Discharge:    Reason for Discharge: Patient has not made expected progress due to interrupted treatment attendance.  Patient has failed to schedule further appointments.  Medicare G-code: Patient did not attend a final scheduled session prior to discharge. Unable to determine discharge functional status.    Equipment Issued:     Discharge Plan: Patient to continue home program.

## 2018-09-06 ENCOUNTER — RECORDS - HEALTHEAST (OUTPATIENT)
Dept: LAB | Facility: CLINIC | Age: 83
End: 2018-09-06

## 2018-09-07 ENCOUNTER — TELEPHONE (OUTPATIENT)
Dept: AUDIOLOGY | Facility: CLINIC | Age: 83
End: 2018-09-07

## 2018-09-07 LAB — HGB BLD-MCNC: 9.6 G/DL (ref 12–16)

## 2018-09-07 NOTE — TELEPHONE ENCOUNTER
Health Call Center    Phone Message    May a detailed message be left on voicemail: yes    Reason for Call: Other: Patient daughter in law Randi, called to request a call to discuss upcoming appt. She just had questions about what they should bring to the appt and/or how they should prepare if anything, as this is her first time brinigng patient in. Also, she wanted staff to know patient is wheelchair bound.      Action Taken: Message routed to:  [facility] ([facility]): aud

## 2018-09-10 NOTE — TELEPHONE ENCOUNTER
9/12/18 appointment rescheduled to a Friday at family request.  Patient will see lM Judd on 12/14/18 for equipment check and speech perception testing.  Family is aware that this will be with a different provider than in the past.      Hannah Chu, CCC-A  Licensed Audiologist  [place] #[id number]

## 2018-09-10 NOTE — TELEPHONE ENCOUNTER
Returned call to patient's daughter in law Randi regarding 9/12/18 appt.  She is unsure of the purpose of the appointment and is unsure which family member scheduled it.  Since it is not urgent in the family's opinion, they would like to reschedule to a Friday when their is a family member off of work.  I explained to Randi that I'm not in clinic on Fridays but that another provider could see Radha.  She felt this would be a better option due to transportation and mobility (now in a wheelchair living in nursing home; cognitive decline).  I will have  reach out to Randi to reschedule to a Friday appointment.  We will schedule CIB 90 to allow time for testing.  Randi expressed understanding and agreement with this plan.      Hannah Chu, CCC-A  Licensed Audiologist  [place] #[id number]

## 2018-09-18 ENCOUNTER — RECORDS - HEALTHEAST (OUTPATIENT)
Dept: LAB | Facility: CLINIC | Age: 83
End: 2018-09-18

## 2018-09-19 LAB
ANION GAP SERPL CALCULATED.3IONS-SCNC: 5 MMOL/L (ref 5–18)
BUN SERPL-MCNC: 26 MG/DL (ref 8–28)
CALCIUM SERPL-MCNC: 8.6 MG/DL (ref 8.5–10.5)
CHLORIDE BLD-SCNC: 101 MMOL/L (ref 98–107)
CO2 SERPL-SCNC: 27 MMOL/L (ref 22–31)
CREAT SERPL-MCNC: 1 MG/DL (ref 0.6–1.1)
GFR SERPL CREATININE-BSD FRML MDRD: 53 ML/MIN/1.73M2
GLUCOSE BLD-MCNC: 102 MG/DL (ref 70–125)
POTASSIUM BLD-SCNC: 4.6 MMOL/L (ref 3.5–5)
SODIUM SERPL-SCNC: 133 MMOL/L (ref 136–145)

## 2018-10-11 ENCOUNTER — RECORDS - HEALTHEAST (OUTPATIENT)
Dept: LAB | Facility: CLINIC | Age: 83
End: 2018-10-11

## 2018-10-12 LAB
ALBUMIN SERPL-MCNC: 3.1 G/DL (ref 3.5–5)
ERYTHROCYTE [DISTWIDTH] IN BLOOD BY AUTOMATED COUNT: 14.6 % (ref 11–14.5)
FERRITIN SERPL-MCNC: 146 NG/ML (ref 10–130)
HCT VFR BLD AUTO: 30.8 % (ref 35–47)
HGB BLD-MCNC: 9.7 G/DL (ref 12–16)
IRON SATN MFR SERPL: 16 % (ref 20–50)
IRON SERPL-MCNC: 37 UG/DL (ref 42–175)
IRON SERPL-MCNC: 37 UG/DL (ref 42–175)
MCH RBC QN AUTO: 28.3 PG (ref 27–34)
MCHC RBC AUTO-ENTMCNC: 31.5 G/DL (ref 32–36)
MCV RBC AUTO: 90 FL (ref 80–100)
PLATELET # BLD AUTO: 387 THOU/UL (ref 140–440)
PMV BLD AUTO: 8.8 FL (ref 8.5–12.5)
RBC # BLD AUTO: 3.43 MILL/UL (ref 3.8–5.4)
TIBC SERPL-MCNC: 225 UG/DL (ref 313–563)
TRANSFERRIN SERPL-MCNC: 180 MG/DL (ref 212–360)
WBC: 6.7 THOU/UL (ref 4–11)

## 2018-10-24 ENCOUNTER — RECORDS - HEALTHEAST (OUTPATIENT)
Dept: LAB | Facility: CLINIC | Age: 83
End: 2018-10-24

## 2018-10-25 LAB
ANION GAP SERPL CALCULATED.3IONS-SCNC: 7 MMOL/L (ref 5–18)
BUN SERPL-MCNC: 30 MG/DL (ref 8–28)
CALCIUM SERPL-MCNC: 9 MG/DL (ref 8.5–10.5)
CHLORIDE BLD-SCNC: 101 MMOL/L (ref 98–107)
CO2 SERPL-SCNC: 27 MMOL/L (ref 22–31)
CREAT SERPL-MCNC: 0.88 MG/DL (ref 0.6–1.1)
GFR SERPL CREATININE-BSD FRML MDRD: >60 ML/MIN/1.73M2
GLUCOSE BLD-MCNC: 110 MG/DL (ref 70–125)
POTASSIUM BLD-SCNC: 4.6 MMOL/L (ref 3.5–5)
SODIUM SERPL-SCNC: 135 MMOL/L (ref 136–145)